# Patient Record
Sex: FEMALE | Race: WHITE | HISPANIC OR LATINO | Employment: UNEMPLOYED | URBAN - METROPOLITAN AREA
[De-identification: names, ages, dates, MRNs, and addresses within clinical notes are randomized per-mention and may not be internally consistent; named-entity substitution may affect disease eponyms.]

---

## 2017-10-26 ENCOUNTER — HOSPITAL ENCOUNTER (EMERGENCY)
Facility: HOSPITAL | Age: 47
Discharge: HOME | End: 2017-10-26
Attending: EMERGENCY MEDICINE
Payer: MEDICAID

## 2017-10-26 VITALS
WEIGHT: 204 LBS | OXYGEN SATURATION: 98 % | SYSTOLIC BLOOD PRESSURE: 129 MMHG | DIASTOLIC BLOOD PRESSURE: 67 MMHG | TEMPERATURE: 99 F | RESPIRATION RATE: 15 BRPM | BODY MASS INDEX: 36.14 KG/M2 | HEIGHT: 63 IN | HEART RATE: 77 BPM

## 2017-10-26 DIAGNOSIS — M54.32 SCIATICA OF LEFT SIDE: Primary | ICD-10-CM

## 2017-10-26 LAB
B-HCG UR QL: NEGATIVE
CTP QC/QA: YES

## 2017-10-26 PROCEDURE — 99284 EMERGENCY DEPT VISIT MOD MDM: CPT | Mod: 25

## 2017-10-26 PROCEDURE — 81025 URINE PREGNANCY TEST: CPT | Performed by: EMERGENCY MEDICINE

## 2017-10-26 PROCEDURE — 96372 THER/PROPH/DIAG INJ SC/IM: CPT

## 2017-10-26 PROCEDURE — 25000003 PHARM REV CODE 250: Performed by: EMERGENCY MEDICINE

## 2017-10-26 PROCEDURE — 63600175 PHARM REV CODE 636 W HCPCS: Performed by: EMERGENCY MEDICINE

## 2017-10-26 RX ORDER — HYDROCODONE BITARTRATE AND ACETAMINOPHEN 7.5; 325 MG/1; MG/1
1 TABLET ORAL EVERY 6 HOURS PRN
Qty: 15 TABLET | Refills: 0 | Status: SHIPPED | OUTPATIENT
Start: 2017-10-26 | End: 2017-11-05

## 2017-10-26 RX ORDER — ORPHENADRINE CITRATE 30 MG/ML
60 INJECTION INTRAMUSCULAR; INTRAVENOUS
Status: COMPLETED | OUTPATIENT
Start: 2017-10-26 | End: 2017-10-26

## 2017-10-26 RX ORDER — IBUPROFEN 600 MG/1
600 TABLET ORAL EVERY 6 HOURS PRN
Qty: 20 TABLET | Refills: 0 | OUTPATIENT
Start: 2017-10-26 | End: 2020-02-06

## 2017-10-26 RX ORDER — ONDANSETRON 4 MG/1
4 TABLET, ORALLY DISINTEGRATING ORAL
Status: COMPLETED | OUTPATIENT
Start: 2017-10-26 | End: 2017-10-26

## 2017-10-26 RX ORDER — KETOROLAC TROMETHAMINE 30 MG/ML
60 INJECTION, SOLUTION INTRAMUSCULAR; INTRAVENOUS
Status: COMPLETED | OUTPATIENT
Start: 2017-10-26 | End: 2017-10-26

## 2017-10-26 RX ORDER — OXYCODONE AND ACETAMINOPHEN 7.5; 325 MG/1; MG/1
1 TABLET ORAL ONCE
Status: COMPLETED | OUTPATIENT
Start: 2017-10-26 | End: 2017-10-26

## 2017-10-26 RX ORDER — CYCLOBENZAPRINE HCL 5 MG
5 TABLET ORAL 3 TIMES DAILY PRN
Qty: 20 TABLET | Refills: 0 | Status: SHIPPED | OUTPATIENT
Start: 2017-10-26 | End: 2017-10-31

## 2017-10-26 RX ADMIN — OXYCODONE HYDROCHLORIDE AND ACETAMINOPHEN 1 TABLET: 7.5; 325 TABLET ORAL at 11:10

## 2017-10-26 RX ADMIN — KETOROLAC TROMETHAMINE 60 MG: 30 INJECTION, SOLUTION INTRAMUSCULAR at 11:10

## 2017-10-26 RX ADMIN — ONDANSETRON 4 MG: 4 TABLET, ORALLY DISINTEGRATING ORAL at 11:10

## 2017-10-26 RX ADMIN — ORPHENADRINE CITRATE 60 MG: 30 INJECTION INTRAMUSCULAR; INTRAVENOUS at 11:10

## 2017-10-26 NOTE — ED PROVIDER NOTES
Encounter Date: 10/26/2017       History     Chief Complaint   Patient presents with    Back Pain     lft buttocks pain radiating down leg     The history is provided by the patient. The history is limited by a language barrier. A  was used.   Back Pain    This is a new problem. The pain is associated with no known injury. The pain is present in the lumbar spine. The pain radiates to the left leg. The pain is at a severity of 10/10. The pain is the same all the time. Pertinent negatives include no fever, no numbness, no abdominal pain, no bowel incontinence, no bladder incontinence, no dysuria and no paresthesias.     Review of patient's allergies indicates:  No Known Allergies  Past Medical History:   Diagnosis Date    Migraine headache      Past Surgical History:   Procedure Laterality Date     SECTION       History reviewed. No pertinent family history.  Social History   Substance Use Topics    Smoking status: Current Every Day Smoker    Smokeless tobacco: Never Used    Alcohol use No     Review of Systems   Constitutional: Negative for fever.   Gastrointestinal: Negative for abdominal pain, bowel incontinence, nausea and vomiting.   Genitourinary: Negative for bladder incontinence and dysuria.   Musculoskeletal: Positive for back pain. Negative for neck pain.   Skin: Negative for rash.   Neurological: Negative for numbness and paresthesias.   All other systems reviewed and are negative.      Physical Exam     Initial Vitals [10/26/17 1018]   BP Pulse Resp Temp SpO2   (!) 141/99 95 15 98.6 °F (37 °C) 98 %      MAP       113         Physical Exam    Nursing note and vitals reviewed.  Constitutional: She appears distressed.   HENT:   Head: Normocephalic and atraumatic.   Eyes: EOM are normal.   Neck: Normal range of motion. Neck supple.   Cardiovascular: Normal rate, regular rhythm and normal heart sounds.   Pulmonary/Chest: Breath sounds normal.   Abdominal: Soft. There is no  tenderness.   Musculoskeletal: Normal range of motion.   Tenderness of the left lower lumbar paraspinous muscle extending to the left upper buttock.   Neurological: She is alert and oriented to person, place, and time. She has normal strength and normal reflexes. No sensory deficit.   Skin: Skin is warm and dry.   Psychiatric: Her behavior is normal. Thought content normal.         ED Course   Procedures  Labs Reviewed   POCT URINE PREGNANCY        Imaging Results          X-Ray Lumbar Spine Ap And Lateral (Final result)  Result time 10/26/17 11:43:51    Final result by Manish Navas MD (10/26/17 11:43:51)                 Impression:        No acute bony abnormality in the lumbar spine.      Electronically signed by: Manish Navas  Date:     10/26/17  Time:    11:43              Narrative:    COMPARISON: None.    CLINICAL INFORMATION: Sciatica.    FINDINGS: Three views of the lumbar spine.  No evidence of acute fracture or subluxation.  Normal curvature and alignment.  Vertebral body and disc space heights are relatively well-maintained. Mild calcific atherosclerosis of the abdominal aorta.                                 Medical Decision Making:   Clinical Tests:   Radiological Study: Ordered and Reviewed  ED Management:  46-year-old female with left-sided sciatica.  Lumbar x-rays unremarkable.  She was given IM shots of Toradol and Norflex here in the ED.  She was also given a Percocet.  Patient will be discharged home with prescriptions for ibuprofen, Flexeril and Norco.  I have suggested she follow-up with her primary physician as soon as able for recheck, further treatment as warranted, and/or referral.                     ED Course      Clinical Impression:   The encounter diagnosis was Sciatica of left side.                           Antione Reid MD  10/26/17 1580

## 2017-10-26 NOTE — ED NOTES
Pt here c/o left lower back/gluteal pain that radiates down leg. described as shooting. denies med allergies. denies new activity,bowel or bladder changes.

## 2018-11-07 ENCOUNTER — HOSPITAL ENCOUNTER (EMERGENCY)
Facility: HOSPITAL | Age: 48
Discharge: HOME | End: 2018-11-07
Attending: EMERGENCY MEDICINE
Payer: MEDICAID

## 2018-11-07 VITALS
HEART RATE: 92 BPM | WEIGHT: 204 LBS | SYSTOLIC BLOOD PRESSURE: 158 MMHG | DIASTOLIC BLOOD PRESSURE: 86 MMHG | HEIGHT: 63 IN | TEMPERATURE: 98 F | BODY MASS INDEX: 36.14 KG/M2 | RESPIRATION RATE: 18 BRPM | OXYGEN SATURATION: 97 %

## 2018-11-07 DIAGNOSIS — N20.0 KIDNEY STONE: Primary | ICD-10-CM

## 2018-11-07 LAB
ALBUMIN SERPL BCP-MCNC: 3.4 G/DL
ALP SERPL-CCNC: 101 U/L
ALT SERPL W/O P-5'-P-CCNC: 18 U/L
ANION GAP SERPL CALC-SCNC: 10 MMOL/L
AST SERPL-CCNC: 12 U/L
B-HCG UR QL: NEGATIVE
BASOPHILS # BLD AUTO: 0.04 K/UL
BASOPHILS NFR BLD: 0.3 %
BILIRUB SERPL-MCNC: 0.2 MG/DL
BILIRUB UR QL STRIP: NEGATIVE
BUN SERPL-MCNC: 12 MG/DL
CALCIUM SERPL-MCNC: 9.3 MG/DL
CHLORIDE SERPL-SCNC: 103 MMOL/L
CLARITY UR: CLEAR
CO2 SERPL-SCNC: 23 MMOL/L
COLOR UR: YELLOW
CREAT SERPL-MCNC: 0.8 MG/DL
CTP QC/QA: YES
DIFFERENTIAL METHOD: ABNORMAL
EOSINOPHIL # BLD AUTO: 0.2 K/UL
EOSINOPHIL NFR BLD: 1.7 %
ERYTHROCYTE [DISTWIDTH] IN BLOOD BY AUTOMATED COUNT: 15 %
EST. GFR  (AFRICAN AMERICAN): >60 ML/MIN/1.73 M^2
EST. GFR  (NON AFRICAN AMERICAN): >60 ML/MIN/1.73 M^2
GLUCOSE SERPL-MCNC: 133 MG/DL
GLUCOSE UR QL STRIP: NEGATIVE
HCT VFR BLD AUTO: 42.3 %
HGB BLD-MCNC: 13.7 G/DL
HGB UR QL STRIP: ABNORMAL
KETONES UR QL STRIP: NEGATIVE
LEUKOCYTE ESTERASE UR QL STRIP: NEGATIVE
LYMPHOCYTES # BLD AUTO: 2.1 K/UL
LYMPHOCYTES NFR BLD: 17.7 %
MCH RBC QN AUTO: 26.6 PG
MCHC RBC AUTO-ENTMCNC: 32.4 G/DL
MCV RBC AUTO: 82 FL
MICROSCOPIC COMMENT: ABNORMAL
MONOCYTES # BLD AUTO: 1 K/UL
MONOCYTES NFR BLD: 8.6 %
NEUTROPHILS # BLD AUTO: 8.6 K/UL
NEUTROPHILS NFR BLD: 71.5 %
NITRITE UR QL STRIP: NEGATIVE
PH UR STRIP: 6 [PH] (ref 5–8)
PLATELET # BLD AUTO: 249 K/UL
PMV BLD AUTO: 12.6 FL
POTASSIUM SERPL-SCNC: 3.9 MMOL/L
PROT SERPL-MCNC: 7.5 G/DL
PROT UR QL STRIP: NEGATIVE
RBC # BLD AUTO: 5.15 M/UL
RBC #/AREA URNS HPF: 5 /HPF (ref 0–4)
SODIUM SERPL-SCNC: 136 MMOL/L
SP GR UR STRIP: >=1.03 (ref 1–1.03)
SQUAMOUS #/AREA URNS HPF: 5 /HPF
URN SPEC COLLECT METH UR: ABNORMAL
UROBILINOGEN UR STRIP-ACNC: NEGATIVE EU/DL
WBC # BLD AUTO: 12.05 K/UL

## 2018-11-07 PROCEDURE — 96374 THER/PROPH/DIAG INJ IV PUSH: CPT

## 2018-11-07 PROCEDURE — 96375 TX/PRO/DX INJ NEW DRUG ADDON: CPT

## 2018-11-07 PROCEDURE — 85025 COMPLETE CBC W/AUTO DIFF WBC: CPT

## 2018-11-07 PROCEDURE — 99284 EMERGENCY DEPT VISIT MOD MDM: CPT | Mod: 25

## 2018-11-07 PROCEDURE — 81025 URINE PREGNANCY TEST: CPT | Performed by: EMERGENCY MEDICINE

## 2018-11-07 PROCEDURE — 80053 COMPREHEN METABOLIC PANEL: CPT

## 2018-11-07 PROCEDURE — 81000 URINALYSIS NONAUTO W/SCOPE: CPT

## 2018-11-07 PROCEDURE — 63600175 PHARM REV CODE 636 W HCPCS: Performed by: EMERGENCY MEDICINE

## 2018-11-07 RX ORDER — NAPROXEN 500 MG/1
500 TABLET ORAL 2 TIMES DAILY WITH MEALS
Qty: 30 TABLET | Refills: 0 | OUTPATIENT
Start: 2018-11-07 | End: 2020-02-06

## 2018-11-07 RX ORDER — KETOROLAC TROMETHAMINE 30 MG/ML
15 INJECTION, SOLUTION INTRAMUSCULAR; INTRAVENOUS
Status: COMPLETED | OUTPATIENT
Start: 2018-11-07 | End: 2018-11-07

## 2018-11-07 RX ORDER — ONDANSETRON 4 MG/1
4 TABLET, ORALLY DISINTEGRATING ORAL EVERY 6 HOURS PRN
Qty: 12 TABLET | Refills: 0 | Status: SHIPPED | OUTPATIENT
Start: 2018-11-07 | End: 2021-02-19 | Stop reason: CLARIF

## 2018-11-07 RX ORDER — MORPHINE SULFATE 4 MG/ML
4 INJECTION, SOLUTION INTRAMUSCULAR; INTRAVENOUS
Status: COMPLETED | OUTPATIENT
Start: 2018-11-07 | End: 2018-11-07

## 2018-11-07 RX ORDER — HYDROCODONE BITARTRATE AND ACETAMINOPHEN 5; 325 MG/1; MG/1
1 TABLET ORAL EVERY 4 HOURS PRN
Qty: 18 TABLET | Refills: 0 | Status: SHIPPED | OUTPATIENT
Start: 2018-11-07 | End: 2021-02-19 | Stop reason: CLARIF

## 2018-11-07 RX ORDER — TAMSULOSIN HYDROCHLORIDE 0.4 MG/1
0.4 CAPSULE ORAL DAILY
Qty: 10 CAPSULE | Refills: 0 | Status: SHIPPED | OUTPATIENT
Start: 2018-11-07 | End: 2023-01-25

## 2018-11-07 RX ADMIN — MORPHINE SULFATE 4 MG: 4 INJECTION INTRAVENOUS at 02:11

## 2018-11-07 RX ADMIN — KETOROLAC TROMETHAMINE 15 MG: 30 INJECTION, SOLUTION INTRAMUSCULAR at 01:11

## 2018-11-07 RX ADMIN — MORPHINE SULFATE 4 MG: 4 INJECTION INTRAVENOUS at 03:11

## 2018-11-07 NOTE — ED NOTES
Pt ambulatory to ED with c/o left sided flank pain that radiates to abdomen. Rates pain as 10/10 and states the pain started tonight. Reports feeling like she needs to urinate but has not been able to. Ion at bedside to translate. Mildly distressed, AAOx3.

## 2018-11-07 NOTE — ED NOTES
I have interpreted for Dr. Salazar at the bedside to update the patient on her results and follow up information.

## 2018-11-07 NOTE — ED PROVIDER NOTES
Encounter Date: 2018    SCRIBE #1 NOTE: I, Zuri Hubbard, am scribing for, and in the presence of,  Dr. Salazar. I have scribed the entire note.       History     Chief Complaint   Patient presents with    Flank Pain     Patient presents to the ED with reports of having left sided flank pain that radiates around her abomen. States the pain started tonight. No relief with excedrin. Reports having the sensation of needing to urinate but has not gone to the bathroom.      Clayton Sandoval is a 47 y.o. female who  has a past medical history of Migraine headache.    The patient presents to the ED due to left lower back pain with 2 episodes of emesis that began approximately 2 hours ago. She also reports of burning sensation during urination and sensation to urinate. However the patient reports she is unable to urinate. She denies fever. She reports of no relief from Excedrin. Her LMP was , but reports she normally has an irregular cycle.      The history is provided by the patient ().     Review of patient's allergies indicates:  No Known Allergies  Past Medical History:   Diagnosis Date    Migraine headache      Past Surgical History:   Procedure Laterality Date     SECTION       History reviewed. No pertinent family history.  Social History     Tobacco Use    Smoking status: Current Every Day Smoker     Packs/day: 0.35     Types: Cigarettes    Smokeless tobacco: Never Used   Substance Use Topics    Alcohol use: No    Drug use: No     Review of Systems   Constitutional: Negative for chills and fever.   HENT: Negative for congestion, rhinorrhea and sore throat.    Eyes: Negative for redness and visual disturbance.   Respiratory: Negative for cough, shortness of breath and wheezing.    Cardiovascular: Negative for chest pain and palpitations.   Gastrointestinal: Positive for vomiting. Negative for abdominal pain, diarrhea and nausea.   Genitourinary: Positive for difficulty  urinating and dysuria (burning sensation). Negative for hematuria.   Musculoskeletal: Positive for back pain (left lower back). Negative for myalgias and neck pain.   Skin: Negative for rash.   Neurological: Negative for dizziness, weakness and light-headedness.   Psychiatric/Behavioral: Negative for confusion.       Physical Exam     Initial Vitals [11/07/18 0126]   BP Pulse Resp Temp SpO2   (!) 181/104 97 20 98.4 °F (36.9 °C) 100 %      MAP       --         Physical Exam    Nursing note and vitals reviewed.  Constitutional: She appears well-developed and well-nourished.   HENT:   Head: Normocephalic and atraumatic.   Eyes: EOM are normal. Pupils are equal, round, and reactive to light.   Neck: Normal range of motion. Neck supple.   Cardiovascular: Normal rate, regular rhythm and normal heart sounds.   Pulmonary/Chest: Breath sounds normal. No respiratory distress.   Abdominal: Soft. Bowel sounds are normal. There is no tenderness.   Musculoskeletal: Normal range of motion. She exhibits tenderness (Left lower back).   Neurological: She is alert and oriented to person, place, and time. She has normal strength.   Skin: Skin is warm and dry. Capillary refill takes less than 2 seconds.         ED Course   Procedures  Labs Reviewed   CBC W/ AUTO DIFFERENTIAL - Abnormal; Notable for the following components:       Result Value    MCH 26.6 (*)     RDW 15.0 (*)     Gran # (ANC) 8.6 (*)     Lymph% 17.7 (*)     All other components within normal limits   COMPREHENSIVE METABOLIC PANEL - Abnormal; Notable for the following components:    Glucose 133 (*)     Albumin 3.4 (*)     All other components within normal limits   URINALYSIS, REFLEX TO URINE CULTURE - Abnormal; Notable for the following components:    Specific Gravity, UA >=1.030 (*)     Occult Blood UA 2+ (*)     All other components within normal limits    Narrative:     Preferred Collection Type->Urine, Clean Catch   URINALYSIS MICROSCOPIC - Abnormal; Notable for the  following components:    RBC, UA 5 (*)     All other components within normal limits    Narrative:     Preferred Collection Type->Urine, Clean Catch   POCT URINE PREGNANCY          Imaging Results          CT Renal Stone Study ABD Pelvis WO (Final result)  Result time 11/07/18 02:43:50    Final result by Kristi Crespo MD (11/07/18 02:43:50)                 Impression:      1. Small 2 mm stone at the left UVJ resulting in minimal left-sided hydronephrosis.  2. Small uterine fibroids.      Electronically signed by: Kristi Crespo MD  Date:    11/07/2018  Time:    02:43             Narrative:    EXAMINATION:  CT RENAL STONE STUDY ABD PELVIS WO    CLINICAL HISTORY:  Flank pain, stone disease suspected;    TECHNIQUE:  Low dose axial images, sagittal and coronal reformations were obtained from the lung bases to the pubic symphysis.  Contrast was not administered.    COMPARISON:  None    FINDINGS:  The visualized portion of the heart is unremarkable.  The lung bases are clear.    No significant hepatic abnormality seen on this noncontrast exam.  There is no intra-or extrahepatic biliary ductal dilatation.  Gallbladder is not visualized and may be contracted or has been removed.  The stomach, pancreas, spleen, and adrenal glands are unremarkable.    Small 2 mm stone is visualized at the left UVJ resulting in minimal left-sided hydronephrosis.  No additional renal stones are seen.  No hydronephrosis on the right.  No right ureteral stones are visualized.  Urinary bladder is nondistended.  Mild lobular contour seen of the uterus suggestive for small uterine fibroids.    Appendix is visualized and is unremarkable.  The visualized loops of small and large bowel show no evidence of obstruction or inflammation.  No free air or free fluid.    Aorta tapers normally.    No acute osseous abnormality identified. Subcutaneous soft tissue structures are unremarkable.                                 Medical Decision Making:    Clinical Tests:   Lab Tests: Ordered and Reviewed  Radiological Study: Ordered and Reviewed  ED Management:  Pt has 2mm stone at left uvj. No uti. Will dc w pain control and flomax. F/u w urology              Attending Attestation:           Physician Attestation for Scribe:  Physician Attestation Statement for Scribe #1: I, Toi Salazar, reviewed documentation, as scribed by Zuri hall in my presence, and it is both accurate and complete.                    Clinical Impression:     1. Kidney stone        Disposition:   Disposition: Discharged  Condition: Stable                        Toi Salazar MD  11/07/18 0256

## 2020-02-06 ENCOUNTER — HOSPITAL ENCOUNTER (EMERGENCY)
Facility: HOSPITAL | Age: 50
Discharge: HOME | End: 2020-02-06
Attending: EMERGENCY MEDICINE
Payer: MEDICAID

## 2020-02-06 VITALS
BODY MASS INDEX: 42.93 KG/M2 | SYSTOLIC BLOOD PRESSURE: 148 MMHG | WEIGHT: 199 LBS | HEIGHT: 57 IN | HEART RATE: 99 BPM | DIASTOLIC BLOOD PRESSURE: 95 MMHG | RESPIRATION RATE: 19 BRPM | OXYGEN SATURATION: 97 % | TEMPERATURE: 100 F

## 2020-02-06 DIAGNOSIS — M54.42 ACUTE BILATERAL LOW BACK PAIN WITH LEFT-SIDED SCIATICA: Primary | ICD-10-CM

## 2020-02-06 LAB
ALBUMIN SERPL BCP-MCNC: 3.6 G/DL (ref 3.5–5.2)
ALP SERPL-CCNC: 96 U/L (ref 55–135)
ALT SERPL W/O P-5'-P-CCNC: 19 U/L (ref 10–44)
ANION GAP SERPL CALC-SCNC: 8 MMOL/L (ref 8–16)
AST SERPL-CCNC: 16 U/L (ref 10–40)
BASOPHILS # BLD AUTO: 0.07 K/UL (ref 0–0.2)
BASOPHILS NFR BLD: 0.7 % (ref 0–1.9)
BILIRUB SERPL-MCNC: 0.4 MG/DL (ref 0.1–1)
BILIRUB UR QL STRIP: NEGATIVE
BUN SERPL-MCNC: 6 MG/DL (ref 6–20)
CALCIUM SERPL-MCNC: 9.1 MG/DL (ref 8.7–10.5)
CHLORIDE SERPL-SCNC: 104 MMOL/L (ref 95–110)
CLARITY UR: CLEAR
CO2 SERPL-SCNC: 27 MMOL/L (ref 23–29)
COLOR UR: YELLOW
CREAT SERPL-MCNC: 0.6 MG/DL (ref 0.5–1.4)
DIFFERENTIAL METHOD: ABNORMAL
EOSINOPHIL # BLD AUTO: 0.2 K/UL (ref 0–0.5)
EOSINOPHIL NFR BLD: 1.6 % (ref 0–8)
ERYTHROCYTE [DISTWIDTH] IN BLOOD BY AUTOMATED COUNT: 14.3 % (ref 11.5–14.5)
EST. GFR  (AFRICAN AMERICAN): >60 ML/MIN/1.73 M^2
EST. GFR  (NON AFRICAN AMERICAN): >60 ML/MIN/1.73 M^2
GLUCOSE SERPL-MCNC: 92 MG/DL (ref 70–110)
GLUCOSE UR QL STRIP: NEGATIVE
HCT VFR BLD AUTO: 43.6 % (ref 37–48.5)
HGB BLD-MCNC: 14 G/DL (ref 12–16)
HGB UR QL STRIP: ABNORMAL
IMM GRANULOCYTES # BLD AUTO: 0.08 K/UL (ref 0–0.04)
IMM GRANULOCYTES NFR BLD AUTO: 0.8 % (ref 0–0.5)
INFLUENZA A, MOLECULAR: NEGATIVE
INFLUENZA B, MOLECULAR: NEGATIVE
KETONES UR QL STRIP: NEGATIVE
LEUKOCYTE ESTERASE UR QL STRIP: NEGATIVE
LYMPHOCYTES # BLD AUTO: 2.2 K/UL (ref 1–4.8)
LYMPHOCYTES NFR BLD: 21.4 % (ref 18–48)
MCH RBC QN AUTO: 26.8 PG (ref 27–31)
MCHC RBC AUTO-ENTMCNC: 32.1 G/DL (ref 32–36)
MCV RBC AUTO: 83 FL (ref 82–98)
MONOCYTES # BLD AUTO: 0.6 K/UL (ref 0.3–1)
MONOCYTES NFR BLD: 6.3 % (ref 4–15)
NEUTROPHILS # BLD AUTO: 7.1 K/UL (ref 1.8–7.7)
NEUTROPHILS NFR BLD: 69.2 % (ref 38–73)
NITRITE UR QL STRIP: NEGATIVE
NRBC BLD-RTO: 0 /100 WBC
PH UR STRIP: 7 [PH] (ref 5–8)
PLATELET # BLD AUTO: 290 K/UL (ref 150–350)
PMV BLD AUTO: 11.9 FL (ref 9.2–12.9)
POTASSIUM SERPL-SCNC: 3.8 MMOL/L (ref 3.5–5.1)
PROT SERPL-MCNC: 7.5 G/DL (ref 6–8.4)
PROT UR QL STRIP: NEGATIVE
RBC # BLD AUTO: 5.23 M/UL (ref 4–5.4)
SODIUM SERPL-SCNC: 139 MMOL/L (ref 136–145)
SP GR UR STRIP: 1.02 (ref 1–1.03)
SPECIMEN SOURCE: NORMAL
URN SPEC COLLECT METH UR: ABNORMAL
UROBILINOGEN UR STRIP-ACNC: NEGATIVE EU/DL
WBC # BLD AUTO: 10.24 K/UL (ref 3.9–12.7)

## 2020-02-06 PROCEDURE — 96375 TX/PRO/DX INJ NEW DRUG ADDON: CPT

## 2020-02-06 PROCEDURE — 96374 THER/PROPH/DIAG INJ IV PUSH: CPT

## 2020-02-06 PROCEDURE — 85025 COMPLETE CBC W/AUTO DIFF WBC: CPT

## 2020-02-06 PROCEDURE — 81003 URINALYSIS AUTO W/O SCOPE: CPT

## 2020-02-06 PROCEDURE — 96361 HYDRATE IV INFUSION ADD-ON: CPT

## 2020-02-06 PROCEDURE — 87502 INFLUENZA DNA AMP PROBE: CPT

## 2020-02-06 PROCEDURE — 80053 COMPREHEN METABOLIC PANEL: CPT

## 2020-02-06 PROCEDURE — 63600175 PHARM REV CODE 636 W HCPCS: Performed by: PHYSICIAN ASSISTANT

## 2020-02-06 PROCEDURE — 99284 EMERGENCY DEPT VISIT MOD MDM: CPT | Mod: 25

## 2020-02-06 RX ORDER — KETOROLAC TROMETHAMINE 30 MG/ML
15 INJECTION, SOLUTION INTRAMUSCULAR; INTRAVENOUS
Status: COMPLETED | OUTPATIENT
Start: 2020-02-06 | End: 2020-02-06

## 2020-02-06 RX ORDER — LIDOCAINE 50 MG/G
1 PATCH TOPICAL DAILY
Qty: 15 PATCH | Refills: 0 | Status: SHIPPED | OUTPATIENT
Start: 2020-02-06 | End: 2021-02-19 | Stop reason: CLARIF

## 2020-02-06 RX ORDER — ONDANSETRON 2 MG/ML
4 INJECTION INTRAMUSCULAR; INTRAVENOUS
Status: COMPLETED | OUTPATIENT
Start: 2020-02-06 | End: 2020-02-06

## 2020-02-06 RX ORDER — METHOCARBAMOL 750 MG/1
1500 TABLET, FILM COATED ORAL 3 TIMES DAILY
Qty: 30 TABLET | Refills: 0 | Status: SHIPPED | OUTPATIENT
Start: 2020-02-06 | End: 2020-02-11

## 2020-02-06 RX ORDER — NAPROXEN 500 MG/1
500 TABLET ORAL 2 TIMES DAILY WITH MEALS
Qty: 20 TABLET | Refills: 0 | Status: SHIPPED | OUTPATIENT
Start: 2020-02-06 | End: 2021-02-19 | Stop reason: CLARIF

## 2020-02-06 RX ADMIN — ONDANSETRON 4 MG: 2 INJECTION INTRAMUSCULAR; INTRAVENOUS at 09:02

## 2020-02-06 RX ADMIN — KETOROLAC TROMETHAMINE 15 MG: 30 INJECTION, SOLUTION INTRAMUSCULAR at 09:02

## 2020-02-06 RX ADMIN — SODIUM CHLORIDE 1000 ML: 0.9 INJECTION, SOLUTION INTRAVENOUS at 09:02

## 2020-02-06 NOTE — ED PROVIDER NOTES
Encounter Date: 2020       History     Chief Complaint   Patient presents with    Back Pain     Reports pain that starts in the lower mid back radiating to L flank and around to lower abdomen. States was recently treated for Kidney infection 2 weeks ago.      Clayton Sandoval  49 y.o. female with PMH of migraines and previous nephrolithiasis presented to the ED with c/o low back pain. Patient states that she has had pain for the past several days.  She denies any recent falls or trauma. She states that is located to the bilateral lower back and radiates to the left lateral lower extremity.  Patient does report some mild radiation to the left lower abdomen.  She states that she was recently seen by her PCP and told that she had a kidney infection for which she completed antibiotics.  She denies any dysuria or  symptoms at this time.  She has not tried any medications for the symptoms. She denies any numbness, tingling, weakness, fever, chills, nausea, vomiting, diarrhea or other complaints at this time.    The history is provided by the patient.     Review of patient's allergies indicates:  No Known Allergies  Past Medical History:   Diagnosis Date    Migraine headache      Past Surgical History:   Procedure Laterality Date     SECTION       History reviewed. No pertinent family history.  Social History     Tobacco Use    Smoking status: Current Every Day Smoker     Packs/day: 0.35     Types: Cigarettes    Smokeless tobacco: Never Used   Substance Use Topics    Alcohol use: No    Drug use: No     Review of Systems   Constitutional: Negative for chills and fever.   Respiratory: Negative for shortness of breath.    Gastrointestinal: Negative for constipation, diarrhea, nausea and vomiting.   Genitourinary: Positive for flank pain. Negative for dysuria.   Musculoskeletal: Positive for back pain. Negative for arthralgias.   Skin: Negative for color change and rash.   Neurological: Negative for  weakness.   Hematological: Does not bruise/bleed easily.       Physical Exam     Initial Vitals   BP Pulse Resp Temp SpO2   02/06/20 0920 02/06/20 0920 02/06/20 0920 02/06/20 0922 02/06/20 0920   (!) 148/95 99 19 100.1 °F (37.8 °C) 97 %      MAP       --                Vitals:    02/06/20 0922   BP:    Pulse:    Resp:    Temp: 100.1 °F (37.8 °C)       Physical Exam    Nursing note and vitals reviewed.  Constitutional: Vital signs are normal. She appears well-developed and well-nourished. She is cooperative.  Non-toxic appearance. She does not appear ill. No distress.   HENT:   Head: Normocephalic and atraumatic.   Eyes: Conjunctivae and lids are normal.   Neck: Neck supple. No neck rigidity.   Cardiovascular: Normal rate and regular rhythm.   Pulmonary/Chest: Breath sounds normal. No respiratory distress. She has no wheezes. She has no rhonchi.   Abdominal: Soft. Normal appearance and bowel sounds are normal. There is no tenderness. There is no rigidity and no guarding.   Musculoskeletal: Normal range of motion.        Back:    No midline tenderness; no step-off or obvious bony deformity.  No CVA tenderness. Negative straight leg raise.   Neurological: She is alert and oriented to person, place, and time. She has normal strength. No sensory deficit. GCS eye subscore is 4. GCS verbal subscore is 5. GCS motor subscore is 6.   Skin: Skin is warm, dry and intact. No rash noted.   Psychiatric: She has a normal mood and affect. Her speech is normal and behavior is normal. Thought content normal.         ED Course   Procedures  Labs Reviewed   URINALYSIS, REFLEX TO URINE CULTURE - Abnormal; Notable for the following components:       Result Value    Occult Blood UA Trace (*)     All other components within normal limits    Narrative:     Preferred Collection Type->Urine, Clean Catch   CBC W/ AUTO DIFFERENTIAL   COMPREHENSIVE METABOLIC PANEL          Imaging Results          CT Renal Stone Study ABD Pelvis WO (Final result)   Result time 02/06/20 11:22:06    Final result by Ramos Benavidez Jr., MD (02/06/20 11:22:06)                 Impression:      Negative for obstructive uropathy    Uterine fibroids and atherosclerosis      Electronically signed by: Ramos Benson  Date:    02/06/2020  Time:    11:22             Narrative:    EXAMINATION:  CT RENAL STONE STUDY ABD PELVIS WO    CLINICAL HISTORY:  Flank pain, recurrent stone disease suspected;Flank pain, stone disease suspected;Hematuria;    TECHNIQUE:  Low dose axial images, sagittal and coronal reformations were obtained from the lung bases to the pubic symphysis.  Contrast was not administered.    COMPARISON:  11/07/2018    FINDINGS:  Lung Bases: Unremarkable.    Kidneys/ Ureters: Unremarkable.  No renal or ureteral stone or hydronephrosis.    Liver: Normal in size and attenuation, with no focal hepatic lesions.    Gallbladder: No calcified gallstones.    Bile Ducts: No evidence of dilated ducts.    Pancreas: No mass or peripancreatic fat stranding.    Spleen: Unremarkable.    Adrenals: Unremarkable.    Pelvic organs: Lobulated contour of the uterus suggests fibroids.  No adnexal masses.  Urinary bladder unremarkable.    GI Tract/Mesentery: Noncontrast opacified GI tract including appendix unremarkable.    Retroperitoneum: No significant adenopathy.    Vasculature: Mild atherosclerosis    Bones: Unremarkable.                            Clayton Sandoval  49 y.o. female with PMH of migraines and previous nephrolithiasis presented to the ED with c/o low back pain. Patient states that she has had pain for the past several days.  She denies any recent falls or trauma. She states that is located to the bilateral lower back and radiates to the left lateral lower extremity.  Patient does report some mild radiation to the left lower abdomen.  She states that she was recently seen by her PCP and told that she had a kidney infection for which she completed antibiotics.  She denies any  dysuria or  symptoms at this time.  She has not tried any medications for the symptoms. She denies any numbness, tingling, weakness, fever, chills, nausea, vomiting, diarrhea or other complaints at this time. ROS positive for back pain.  Physical exam reveals patient well appearing in no distress exhibiting smooth steady gait to room. FROM of neck and all extremities with strength 5/5 bilaterally. TTP of the bilateral lower paraspinal muscles with spasm noted; no midline tenderness, bony deformity or step-off.  Patient has tenderness to palpation of the left SI joint.  Negative straight leg raise.  Lungs clear, heart regular rate and rhythm. Abdomen is soft and nontender with no rebound or rigidity; no CVA tenderness.     DDX: back spasm, fracture, dislocation, nephrolithiasis, pyelonephritis, UTI    ED management:  Given patient's history of previous kidney stone and reported pain radiation labs and CT obtained.  Labs grossly unremarkable.  CT negative for any obstructive process or acute etiology to explain pain.  Did discuss on physical exam today symptoms are most consistent with discogenic back pain with some lumbar radiculopathy.  No further workup as patient with no signs to suggest acute infectious process or acute cauda equina.  She did report some improvement symptoms in the ED.  We will treat with symptomatic medications for back spasm. Encouraged rest, hot soaks and massage.     Impression/Plan: The encounter diagnosis was Acute bilateral low back pain with left-sided sciatica.  Discharged with Lidoderm patch, Robaxin and Naprosyn.  Patient will follow up with Primary.  Patient cautioned on when to return to ED.  Pt. Understands and agrees with current treatment plan                                       Clinical Impression:       ICD-10-CM ICD-9-CM   1. Acute bilateral low back pain with left-sided sciatica M54.42 724.2     724.3                             ROBIN Sahu  02/07/20 2245

## 2020-03-13 ENCOUNTER — OFFICE VISIT (OUTPATIENT)
Dept: OBSTETRICS AND GYNECOLOGY | Facility: CLINIC | Age: 50
End: 2020-03-13
Payer: MEDICAID

## 2020-03-13 VITALS
BODY MASS INDEX: 44.19 KG/M2 | SYSTOLIC BLOOD PRESSURE: 130 MMHG | HEIGHT: 57 IN | WEIGHT: 204.81 LBS | DIASTOLIC BLOOD PRESSURE: 90 MMHG

## 2020-03-13 DIAGNOSIS — N93.9 ABNORMAL UTERINE BLEEDING (AUB): Primary | ICD-10-CM

## 2020-03-13 DIAGNOSIS — Z12.31 VISIT FOR SCREENING MAMMOGRAM: ICD-10-CM

## 2020-03-13 PROCEDURE — 88141 CYTOPATH C/V INTERPRET: CPT | Mod: ,,, | Performed by: PATHOLOGY

## 2020-03-13 PROCEDURE — 99213 OFFICE O/P EST LOW 20 MIN: CPT | Mod: PBBFAC,PO,25 | Performed by: OBSTETRICS & GYNECOLOGY

## 2020-03-13 PROCEDURE — 99203 OFFICE O/P NEW LOW 30 MIN: CPT | Mod: 25,S$PBB,, | Performed by: OBSTETRICS & GYNECOLOGY

## 2020-03-13 PROCEDURE — 58100 BIOPSY OF UTERUS LINING: CPT | Mod: S$PBB,,, | Performed by: OBSTETRICS & GYNECOLOGY

## 2020-03-13 PROCEDURE — 88305 TISSUE EXAM BY PATHOLOGIST: CPT | Mod: 26,,, | Performed by: PATHOLOGY

## 2020-03-13 PROCEDURE — 99999 PR PBB SHADOW E&M-EST. PATIENT-LVL III: CPT | Mod: PBBFAC,,, | Performed by: OBSTETRICS & GYNECOLOGY

## 2020-03-13 PROCEDURE — 88141 PR  CYTOPATH CERV/VAG INTERPRET: ICD-10-PCS | Mod: ,,, | Performed by: PATHOLOGY

## 2020-03-13 PROCEDURE — 88305 TISSUE EXAM BY PATHOLOGIST: CPT | Performed by: PATHOLOGY

## 2020-03-13 PROCEDURE — 99999 PR PBB SHADOW E&M-EST. PATIENT-LVL III: ICD-10-PCS | Mod: PBBFAC,,, | Performed by: OBSTETRICS & GYNECOLOGY

## 2020-03-13 PROCEDURE — 88305 TISSUE EXAM BY PATHOLOGIST: ICD-10-PCS | Mod: 26,,, | Performed by: PATHOLOGY

## 2020-03-13 PROCEDURE — 58100 BIOPSY OF UTERUS LINING: CPT | Mod: PBBFAC,PO | Performed by: OBSTETRICS & GYNECOLOGY

## 2020-03-13 PROCEDURE — 58100 PR BIOPSY OF UTERUS LINING: ICD-10-PCS | Mod: S$PBB,,, | Performed by: OBSTETRICS & GYNECOLOGY

## 2020-03-13 PROCEDURE — 88175 CYTOPATH C/V AUTO FLUID REDO: CPT | Performed by: PATHOLOGY

## 2020-03-13 PROCEDURE — 99203 PR OFFICE/OUTPT VISIT, NEW, LEVL III, 30-44 MIN: ICD-10-PCS | Mod: 25,S$PBB,, | Performed by: OBSTETRICS & GYNECOLOGY

## 2020-03-13 NOTE — PROGRESS NOTES
"48 yo perimenopausal female who presents to day to discuss "a cyst".  Reports that recent CT of abdomen showed cyst.  However, when report was read by me - it actually described uterine fibroids.  Patient reports that cycles have been irregular x 1 yr. They can go for months. However, when they do come, they can be quite heavy.  Last pap was about 2 yrs ago. wnl per the patient.  She has never had a mammgram.    ROS:  GENERAL: Denies weight gain or weight loss. Feeling well overall.   SKIN: Denies rash or lesions.   HEAD: Denies head injury or headache.   NODES: Denies enlarged lymph nodes.   CHEST: Denies chest pain or shortness of breath.   CARDIOVASCULAR: Denies palpitations or left sided chest pain.   ABDOMEN: No abdominal pain, constipation, diarrhea, nausea, vomiting or rectal bleeding.   URINARY: No frequency, dysuria, hematuria, or burning on urination.  REPRODUCTIVE: See HPI.   BREASTS: The patient performs breast self-examination and denies pain, lumps, or nipple discharge.   HEMATOLOGIC: No easy bruisability or excessive bleeding.   MUSCULOSKELETAL: Denies joint pain or swelling.   NEUROLOGIC: Denies syncope or weakness.   PSYCHIATRIC: Denies depression, anxiety or mood swings.         PE:   Vitals: BP (!) 130/90 (BP Location: Left arm)   Ht 4' 9" (1.448 m)   Wt 92.9 kg (204 lb 12.9 oz)   LMP 02/23/2020   BMI 44.32 kg/m²   APPEARANCE: Well nourished, well developed, in no acute distress.  SKIN: Normal skin turgor, no lesions.  CHEST: Lungs clear to auscultation.  HEART: Regular rate and rhythm, no murmurs, rubs or gallops.  ABDOMEN: Soft. No tenderness or masses. No hepatosplenomegaly. No hernias. obese  BREASTS: Symmetrical, no skin changes or visible lesions. No palpable masses, nipple discharge or adenopathy bilaterally.  PELVIC: Normal external female genitalia without lesions. Normal hair distribution. Adequate perineal body, normal urethral meatus. Vagina moist and well rugated without lesions " or discharge. Cervix pink and without lesions. No significant cystocele or rectocele. Bimanual exam showed uterus normal size, shape, position, mobile and nontender. Adnexa without masses or tenderness. Urethra and bladder normal.    Date:3/13/2020  Time:10:41 AM  Name of the procedure: Endometrial Biopsy  Indications: Clayton Sandoval is a 49 y.o. female  who presents today for endometrial biopsy secondary to abnormal uterine bleeding.  Patient's last menstrual period was 2020..    Patient consent: Risks/benefits of the procedure were discussed with the patient. Patient's questions were answered.  Consents signed.   TIME OUT completed.  Labs:     Procedure: Speculum placed in vagina; Pap collected; cervix swabbed with betadine x 1; Single tooth tenaculum placed on anterior cervix.  Endometrial pipelle advanced without difficulty x 3 passes;  Sounded to about 6-7cm;  single tooth tenaculum removed.  Hemostasis achieved.  Complications: none  EBL: min  Disposition: Pt tolerated the procedure well.      AP: Abnormal uterine bleeding  -likely due to perimenopausal status vs. Fibroids vs. Hyperplasia  -s/p BTL  -pap collected  -Endometrial biopsy collected  -pelvic US ordered    Mammogram ordered    F/u in 2 wks for gyn visit    s imelda NEWTON

## 2020-03-24 LAB
FINAL PATHOLOGIC DIAGNOSIS: NORMAL
GROSS: NORMAL

## 2020-03-31 ENCOUNTER — TELEPHONE (OUTPATIENT)
Dept: OBSTETRICS AND GYNECOLOGY | Facility: HOSPITAL | Age: 50
End: 2020-03-31

## 2020-03-31 DIAGNOSIS — N93.9 ABNORMAL UTERINE BLEEDING (AUB): Primary | ICD-10-CM

## 2020-03-31 RX ORDER — MEDROXYPROGESTERONE ACETATE 5 MG/1
5 TABLET ORAL DAILY
Qty: 30 TABLET | Refills: 12 | Status: SHIPPED | OUTPATIENT
Start: 2020-03-31 | End: 2020-06-09 | Stop reason: ALTCHOICE

## 2020-03-31 NOTE — TELEPHONE ENCOUNTER
I spoke to the patient today.  She was unable to come in for her visit yesterday to discuss abnormal uterine bleeding.    Endometrial biopsy completed showed proliferative endometrium. No evidence of malignancy or hyperplasia.  Results discussed with the patient.    She reports that she has not had a cycle since her last visit here.    I recommend taking a daily progesterone to decrease the risk of abnormal uterine tissue development until she turns 50 yrs old.    The patient agrees. rx for provera 5mg PO daily given.    Patient instructed to call the office if any problems with AUB once starting the medication.    Should follow p in Dec 2020 to discuss plan after she turns 50 yrs old.    ashwini segura MD

## 2020-04-06 LAB
FINAL PATHOLOGIC DIAGNOSIS: NORMAL
Lab: NORMAL

## 2020-05-27 ENCOUNTER — TELEPHONE (OUTPATIENT)
Dept: OBSTETRICS AND GYNECOLOGY | Facility: CLINIC | Age: 50
End: 2020-05-27

## 2020-05-27 ENCOUNTER — HOSPITAL ENCOUNTER (OUTPATIENT)
Dept: RADIOLOGY | Facility: HOSPITAL | Age: 50
Discharge: HOME | End: 2020-05-27
Attending: OBSTETRICS & GYNECOLOGY
Payer: MEDICAID

## 2020-05-27 DIAGNOSIS — Z12.31 VISIT FOR SCREENING MAMMOGRAM: ICD-10-CM

## 2020-05-27 PROCEDURE — 77067 SCR MAMMO BI INCL CAD: CPT | Mod: TC

## 2020-05-27 PROCEDURE — 77063 BREAST TOMOSYNTHESIS BI: CPT | Mod: 26,,, | Performed by: RADIOLOGY

## 2020-05-27 PROCEDURE — 77067 MAMMO DIGITAL SCREENING BILAT WITH TOMOSYNTHESIS_CAD: ICD-10-PCS | Mod: 26,,, | Performed by: RADIOLOGY

## 2020-05-27 PROCEDURE — 77067 SCR MAMMO BI INCL CAD: CPT | Mod: 26,,, | Performed by: RADIOLOGY

## 2020-05-27 PROCEDURE — 77063 MAMMO DIGITAL SCREENING BILAT WITH TOMOSYNTHESIS_CAD: ICD-10-PCS | Mod: 26,,, | Performed by: RADIOLOGY

## 2020-05-27 NOTE — TELEPHONE ENCOUNTER
Called pt back and rescheduled missed appointments from 3/30/2020 to 6/9/2020 u/s at 7:30 a.m and a gyn with you at 8:30 a.m. Please advise

## 2020-05-27 NOTE — TELEPHONE ENCOUNTER
----- Message from Shell Romero MA sent at 5/27/2020  9:05 AM CDT -----  Contact: [phone]-self      ----- Message -----  From: Laura Roper  Sent: 5/27/2020   9:01 AM CDT  To: Jamal MEHTA Staff    Patient is requesting a call back concerning pt would like to reschedule her Ultrasound that she missed on 3/30. Please call

## 2020-06-09 ENCOUNTER — PROCEDURE VISIT (OUTPATIENT)
Dept: OBSTETRICS AND GYNECOLOGY | Facility: CLINIC | Age: 50
End: 2020-06-09
Payer: MEDICAID

## 2020-06-09 ENCOUNTER — OFFICE VISIT (OUTPATIENT)
Dept: OBSTETRICS AND GYNECOLOGY | Facility: CLINIC | Age: 50
End: 2020-06-09
Payer: MEDICAID

## 2020-06-09 VITALS
SYSTOLIC BLOOD PRESSURE: 152 MMHG | DIASTOLIC BLOOD PRESSURE: 98 MMHG | WEIGHT: 208.31 LBS | BODY MASS INDEX: 45.08 KG/M2

## 2020-06-09 DIAGNOSIS — N93.9 ABNORMAL UTERINE BLEEDING (AUB): ICD-10-CM

## 2020-06-09 DIAGNOSIS — N93.9 ABNORMAL UTERINE BLEEDING (AUB): Primary | ICD-10-CM

## 2020-06-09 PROCEDURE — 99213 OFFICE O/P EST LOW 20 MIN: CPT | Mod: PBBFAC,PO,25 | Performed by: OBSTETRICS & GYNECOLOGY

## 2020-06-09 PROCEDURE — 99999 PR PBB SHADOW E&M-EST. PATIENT-LVL III: CPT | Mod: PBBFAC,,, | Performed by: OBSTETRICS & GYNECOLOGY

## 2020-06-09 PROCEDURE — 99212 PR OFFICE/OUTPT VISIT, EST, LEVL II, 10-19 MIN: ICD-10-PCS | Mod: S$PBB,25,, | Performed by: OBSTETRICS & GYNECOLOGY

## 2020-06-09 PROCEDURE — 99999 PR PBB SHADOW E&M-EST. PATIENT-LVL III: ICD-10-PCS | Mod: PBBFAC,,, | Performed by: OBSTETRICS & GYNECOLOGY

## 2020-06-09 PROCEDURE — 99212 OFFICE O/P EST SF 10 MIN: CPT | Mod: S$PBB,25,, | Performed by: OBSTETRICS & GYNECOLOGY

## 2020-06-09 PROCEDURE — 76830 TRANSVAGINAL US NON-OB: CPT | Mod: PBBFAC,PO

## 2020-06-09 PROCEDURE — 76830 PR  ECHOGRAPHY,TRANSVAGINAL: ICD-10-PCS | Mod: 26,S$PBB,, | Performed by: OBSTETRICS & GYNECOLOGY

## 2020-06-09 PROCEDURE — 76830 TRANSVAGINAL US NON-OB: CPT | Mod: 26,S$PBB,, | Performed by: OBSTETRICS & GYNECOLOGY

## 2020-06-09 RX ORDER — NORETHINDRONE ACETATE AND ETHINYL ESTRADIOL 1.5-30(21)
1 KIT ORAL DAILY
Qty: 30 TABLET | Refills: 11 | Status: SHIPPED | OUTPATIENT
Start: 2020-06-09 | End: 2021-02-19 | Stop reason: CLARIF

## 2020-06-29 ENCOUNTER — CLINICAL SUPPORT (OUTPATIENT)
Dept: URGENT CARE | Facility: CLINIC | Age: 50
End: 2020-06-29
Payer: MEDICAID

## 2020-06-29 VITALS — TEMPERATURE: 98 F | HEART RATE: 98 BPM

## 2020-06-29 DIAGNOSIS — Z11.9 SCREENING EXAMINATION FOR INFECTIOUS DISEASE: Primary | ICD-10-CM

## 2020-06-29 PROCEDURE — U0003 INFECTIOUS AGENT DETECTION BY NUCLEIC ACID (DNA OR RNA); SEVERE ACUTE RESPIRATORY SYNDROME CORONAVIRUS 2 (SARS-COV-2) (CORONAVIRUS DISEASE [COVID-19]), AMPLIFIED PROBE TECHNIQUE, MAKING USE OF HIGH THROUGHPUT TECHNOLOGIES AS DESCRIBED BY CMS-2020-01-R: HCPCS

## 2020-06-29 PROCEDURE — 99211 OFF/OP EST MAY X REQ PHY/QHP: CPT | Mod: S$GLB,,, | Performed by: PHYSICIAN ASSISTANT

## 2020-06-29 PROCEDURE — 99211 PR OFFICE/OUTPT VISIT, EST, LEVL I: ICD-10-PCS | Mod: S$GLB,,, | Performed by: PHYSICIAN ASSISTANT

## 2020-07-03 LAB — SARS-COV-2 RNA RESP QL NAA+PROBE: NOT DETECTED

## 2020-07-04 ENCOUNTER — HOSPITAL ENCOUNTER (EMERGENCY)
Facility: HOSPITAL | Age: 50
Discharge: HOME | End: 2020-07-04
Attending: EMERGENCY MEDICINE
Payer: MEDICAID

## 2020-07-04 ENCOUNTER — TELEPHONE (OUTPATIENT)
Dept: URGENT CARE | Facility: CLINIC | Age: 50
End: 2020-07-04

## 2020-07-04 VITALS
DIASTOLIC BLOOD PRESSURE: 99 MMHG | BODY MASS INDEX: 39.06 KG/M2 | TEMPERATURE: 98 F | OXYGEN SATURATION: 99 % | RESPIRATION RATE: 18 BRPM | WEIGHT: 200 LBS | SYSTOLIC BLOOD PRESSURE: 160 MMHG | HEART RATE: 91 BPM

## 2020-07-04 DIAGNOSIS — G43.809 OTHER MIGRAINE WITHOUT STATUS MIGRAINOSUS, NOT INTRACTABLE: Primary | ICD-10-CM

## 2020-07-04 LAB
B-HCG UR QL: NEGATIVE
CTP QC/QA: YES

## 2020-07-04 PROCEDURE — 96374 THER/PROPH/DIAG INJ IV PUSH: CPT

## 2020-07-04 PROCEDURE — 81025 URINE PREGNANCY TEST: CPT | Performed by: NURSE PRACTITIONER

## 2020-07-04 PROCEDURE — 96375 TX/PRO/DX INJ NEW DRUG ADDON: CPT

## 2020-07-04 PROCEDURE — 63600175 PHARM REV CODE 636 W HCPCS: Performed by: NURSE PRACTITIONER

## 2020-07-04 PROCEDURE — 25000003 PHARM REV CODE 250: Performed by: NURSE PRACTITIONER

## 2020-07-04 PROCEDURE — 99284 EMERGENCY DEPT VISIT MOD MDM: CPT | Mod: 25

## 2020-07-04 RX ORDER — BUTALBITAL, ACETAMINOPHEN AND CAFFEINE 50; 325; 40 MG/1; MG/1; MG/1
1 TABLET ORAL EVERY 4 HOURS PRN
Qty: 12 TABLET | Refills: 0 | Status: SHIPPED | OUTPATIENT
Start: 2020-07-04 | End: 2020-08-03

## 2020-07-04 RX ORDER — METOCLOPRAMIDE HYDROCHLORIDE 5 MG/ML
10 INJECTION INTRAMUSCULAR; INTRAVENOUS
Status: COMPLETED | OUTPATIENT
Start: 2020-07-04 | End: 2020-07-04

## 2020-07-04 RX ORDER — DIPHENHYDRAMINE HYDROCHLORIDE 50 MG/ML
25 INJECTION INTRAMUSCULAR; INTRAVENOUS
Status: COMPLETED | OUTPATIENT
Start: 2020-07-04 | End: 2020-07-04

## 2020-07-04 RX ORDER — KETOROLAC TROMETHAMINE 30 MG/ML
10 INJECTION, SOLUTION INTRAMUSCULAR; INTRAVENOUS
Status: COMPLETED | OUTPATIENT
Start: 2020-07-04 | End: 2020-07-04

## 2020-07-04 RX ADMIN — KETOROLAC TROMETHAMINE 10 MG: 30 INJECTION, SOLUTION INTRAMUSCULAR at 09:07

## 2020-07-04 RX ADMIN — DIPHENHYDRAMINE HYDROCHLORIDE 25 MG: 50 INJECTION, SOLUTION INTRAMUSCULAR; INTRAVENOUS at 09:07

## 2020-07-04 RX ADMIN — SODIUM CHLORIDE 500 ML: 0.9 INJECTION, SOLUTION INTRAVENOUS at 09:07

## 2020-07-04 RX ADMIN — METOCLOPRAMIDE 10 MG: 5 INJECTION, SOLUTION INTRAMUSCULAR; INTRAVENOUS at 09:07

## 2020-07-05 NOTE — DISCHARGE INSTRUCTIONS
Thank you for allowing me to care for you today.  I hope our treatment plan will make you feel better in the next few days.  In order for me to take better care of my future patients and improve our Emergency Department, I would appreciate if you can provide us with feedback.  In the next few days, you may receive a survey in the mail.  If you do, it would mean a great deal to me if you would please take the time to complete it.    Thank you and I hope you feel better.  Nicolle Calderon NP

## 2020-07-05 NOTE — ED PROVIDER NOTES
Encounter Date: 2020       History     Chief Complaint   Patient presents with    Migraine     Migraine x 3 days which she has a 20 yr hx of. Prescribed medication not working. Patient does not know name of medicine. 2.5 hours ago she took excedrine. No associated symnptoms.      The patient is a 49-year-old female with a past medical history of migraines who presents to the ED complaining of a migraine for the past 3 days.  Pain is localized unilaterally on the left.  She reports associated insomnia.  Patient reports having a 20 year history of migraines.  She does see a neurologist.  She typically takes Excedrin and Tylenol with relief.  She has tried both of these medications without improvement this week.  Denies fever, chills, visual changes, photophobia, phonophobia, nausea, vomiting, abdominal pain, numbness, weakness, tingling and other symptoms.  She reports that this migraine is typical of her regular headaches.  She is unsure of the names of other migraine medication she has tried in the past.  No other treatment attempted prior to arrival.    The history is provided by the patient. The history is limited by a language barrier. A  was used.     Review of patient's allergies indicates:  No Known Allergies  Past Medical History:   Diagnosis Date    Disorder of kidney and ureter     Migraine headache      Past Surgical History:   Procedure Laterality Date     SECTION      REDUCTION OF BOTH BREASTS Bilateral     TOTAL REDUCTION MAMMOPLASTY Bilateral     TUBAL LIGATION       Family History   Family history unknown: Yes     Social History     Tobacco Use    Smoking status: Current Every Day Smoker     Packs/day: 0.35     Types: Cigarettes    Smokeless tobacco: Never Used   Substance Use Topics    Alcohol use: No    Drug use: No     Review of Systems   Constitutional: Negative for fever.   HENT: Negative for sore throat.    Eyes: Negative for visual disturbance.    Respiratory: Negative for shortness of breath.    Cardiovascular: Negative for chest pain.   Gastrointestinal: Negative for nausea.   Genitourinary: Negative for dysuria.   Musculoskeletal: Negative for back pain.   Skin: Negative for rash.   Neurological: Positive for headaches. Negative for weakness and numbness.   Hematological: Does not bruise/bleed easily.       Physical Exam     Initial Vitals [07/04/20 1951]   BP Pulse Resp Temp SpO2   (!) 188/96 83 18 98.4 °F (36.9 °C) 97 %      MAP       --         Physical Exam    Nursing note and vitals reviewed.  Constitutional: She appears well-developed and well-nourished.  Non-toxic appearance. No distress.   The patient is alert, oriented, and nontoxic appearing.  No apparent distress.   HENT:   Head: Normocephalic and atraumatic.   Right Ear: Hearing and external ear normal.   Left Ear: Hearing and external ear normal.   Nose: Nose abnormal.   Mouth/Throat: Uvula is midline, oropharynx is clear and moist and mucous membranes are normal. No tonsillar abscesses.   No perceptible facial asymmetry   Eyes: Conjunctivae and EOM are normal. Pupils are equal, round, and reactive to light. Right conjunctiva is not injected. Left conjunctiva is not injected. Right eye exhibits normal extraocular motion and no nystagmus. Left eye exhibits normal extraocular motion and no nystagmus. Right pupil is round and reactive. Left pupil is round and reactive. Pupils are equal.   Neck: Full passive range of motion without pain. Neck supple. No neck rigidity.   No meningismus   Cardiovascular: Normal rate, regular rhythm, normal heart sounds and normal pulses. Exam reveals no gallop and no friction rub.    No murmur heard.  Pulses:       Radial pulses are 2+ on the right side and 2+ on the left side.        Dorsalis pedis pulses are 2+ on the right side and 2+ on the left side.   Pulmonary/Chest: Effort normal and breath sounds normal. No respiratory distress. She has no decreased  breath sounds. She has no wheezes. She has no rhonchi. She has no rales.   Abdominal: Soft. Bowel sounds are normal. She exhibits no distension. There is no abdominal tenderness.   Musculoskeletal: Normal range of motion.   Neurological: She is alert and oriented to person, place, and time. She has normal strength. No cranial nerve deficit or sensory deficit. She exhibits normal muscle tone. She displays no seizure activity. Gait normal. GCS eye subscore is 4. GCS verbal subscore is 5. GCS motor subscore is 6.   No sensory deficits.  Muscular strength is normal and equal bilaterally.  No pronator drift.  Gait is steady and even.  Finger to nose, heel to shin, and alternating hands are all within normal limits.   Skin: Skin is warm, dry and intact. Capillary refill takes less than 2 seconds. No rash noted.   Psychiatric: She has a normal mood and affect. Her speech is normal and behavior is normal. Judgment and thought content normal. Cognition and memory are normal.         ED Course   Procedures  Labs Reviewed   POCT URINE PREGNANCY          Imaging Results    None          Medical Decision Making:   History:   Old Medical Records: I decided to obtain old medical records.  Clinical Tests:   Lab Tests: Ordered and Reviewed  ED Management:  After complete evaluation, including thorough history and physical exam, the patient's symptoms are most likely due to primary headache syndrome (including, but not limited to, tension/cluster/migraine headache). The patient's headaches are similar in character to prior. The history does not suggest sudden/maximal onset of pain consistent with SAH/intracranial bleed. Physical exam is benign without focal weakness, sensory deficit, or cerebellar signs to suggest stroke or intracranial mass. There is no meningismus, fever, or evidence of infection to suggest meningitis/encephalitis. The patient was treated with Toradol, Reglan, and Benadryl and improved.  Patient will be discharged  with a small number of Fioricet and instructed to follow-up with her PCP and neurologist.  All questions answered.  Strict return precautions have been discussed.                                 Clinical Impression:       ICD-10-CM ICD-9-CM   1. Other migraine without status migrainosus, not intractable  G43.809 346.80                                Nicolle Calderon NP  07/04/20 2301

## 2020-07-05 NOTE — ED TRIAGE NOTES
Pt reports via Dr. Edwards that she has been having a migraine X 3days. Pt ststaes she has meds previously prescribes but they aren't working. OTC medicine. excedrine taken today with no relief. No other symptoms presents at this time. Will continue to monitor.

## 2020-09-11 ENCOUNTER — OFFICE VISIT (OUTPATIENT)
Dept: OBSTETRICS AND GYNECOLOGY | Facility: CLINIC | Age: 50
End: 2020-09-11
Payer: MEDICAID

## 2020-09-11 VITALS
WEIGHT: 208.13 LBS | DIASTOLIC BLOOD PRESSURE: 80 MMHG | BODY MASS INDEX: 40.64 KG/M2 | SYSTOLIC BLOOD PRESSURE: 117 MMHG

## 2020-09-11 DIAGNOSIS — N93.9 ABNORMAL UTERINE BLEEDING (AUB): Primary | ICD-10-CM

## 2020-09-11 PROCEDURE — 99212 OFFICE O/P EST SF 10 MIN: CPT | Mod: S$PBB,,, | Performed by: OBSTETRICS & GYNECOLOGY

## 2020-09-11 PROCEDURE — 99213 OFFICE O/P EST LOW 20 MIN: CPT | Mod: PBBFAC,PO | Performed by: OBSTETRICS & GYNECOLOGY

## 2020-09-11 PROCEDURE — 99999 PR PBB SHADOW E&M-EST. PATIENT-LVL III: CPT | Mod: PBBFAC,,, | Performed by: OBSTETRICS & GYNECOLOGY

## 2020-09-11 PROCEDURE — 99212 PR OFFICE/OUTPT VISIT, EST, LEVL II, 10-19 MIN: ICD-10-PCS | Mod: S$PBB,,, | Performed by: OBSTETRICS & GYNECOLOGY

## 2020-09-11 PROCEDURE — 99999 PR PBB SHADOW E&M-EST. PATIENT-LVL III: ICD-10-PCS | Mod: PBBFAC,,, | Performed by: OBSTETRICS & GYNECOLOGY

## 2020-09-11 NOTE — PROGRESS NOTES
50 yo perimenopausal female who presented in 3/2020 to discuss cyst on her ovaries.  Patient also complains of irregular cycles, hot flashes, vaginal dryness, decreased sexual desire.    Pelvic US today shows uterus [id number] with endometrial stripe at 2.4mm  2 small uterine fibroids No problems with ovaries    Endometrial biopys completed at last visit shows proliferative endometrial tissue.    Patient was started on junel 1/30 in June 2020.  Only used medication for one month because she didn't realize that she had a year supply of medication.  Per the patient, no adverse side effects.  Reports that hot flashes and vaginal dryness and sexual desire improved a little bit.    Explained to patient that goal was to take medication for 3 months (not one month) to see if her symptoms improved.  Recommend restarting OCPs.  I called the pharmacy - they have the rx available.    Patient wants a new PCP. Concerned about her bones. Referred to [facility].    F/u in 1 yr    ashwini segura MD

## 2021-02-19 ENCOUNTER — HOSPITAL ENCOUNTER (EMERGENCY)
Facility: HOSPITAL | Age: 51
Discharge: HOME | End: 2021-02-19
Attending: EMERGENCY MEDICINE
Payer: MEDICAID

## 2021-02-19 VITALS
RESPIRATION RATE: 16 BRPM | HEIGHT: 59 IN | BODY MASS INDEX: 41.93 KG/M2 | OXYGEN SATURATION: 99 % | TEMPERATURE: 99 F | SYSTOLIC BLOOD PRESSURE: 169 MMHG | WEIGHT: 208 LBS | HEART RATE: 76 BPM | DIASTOLIC BLOOD PRESSURE: 112 MMHG

## 2021-02-19 DIAGNOSIS — R51.9 NONINTRACTABLE HEADACHE, UNSPECIFIED CHRONICITY PATTERN, UNSPECIFIED HEADACHE TYPE: Primary | ICD-10-CM

## 2021-02-19 LAB
ALBUMIN SERPL BCP-MCNC: 3.5 G/DL (ref 3.5–5.2)
ALP SERPL-CCNC: 126 U/L (ref 55–135)
ALT SERPL W/O P-5'-P-CCNC: 36 U/L (ref 10–44)
ANION GAP SERPL CALC-SCNC: 8 MMOL/L (ref 8–16)
AST SERPL-CCNC: 32 U/L (ref 10–40)
BASOPHILS # BLD AUTO: 0.08 K/UL (ref 0–0.2)
BASOPHILS NFR BLD: 0.7 % (ref 0–1.9)
BILIRUB SERPL-MCNC: 0.3 MG/DL (ref 0.1–1)
BUN SERPL-MCNC: 9 MG/DL (ref 6–20)
CALCIUM SERPL-MCNC: 9.4 MG/DL (ref 8.7–10.5)
CHLORIDE SERPL-SCNC: 105 MMOL/L (ref 95–110)
CO2 SERPL-SCNC: 25 MMOL/L (ref 23–29)
CREAT SERPL-MCNC: 0.6 MG/DL (ref 0.5–1.4)
DIFFERENTIAL METHOD: ABNORMAL
EOSINOPHIL # BLD AUTO: 0.3 K/UL (ref 0–0.5)
EOSINOPHIL NFR BLD: 2.5 % (ref 0–8)
ERYTHROCYTE [DISTWIDTH] IN BLOOD BY AUTOMATED COUNT: 15.1 % (ref 11.5–14.5)
EST. GFR  (AFRICAN AMERICAN): >60 ML/MIN/1.73 M^2
EST. GFR  (NON AFRICAN AMERICAN): >60 ML/MIN/1.73 M^2
GLUCOSE SERPL-MCNC: 97 MG/DL (ref 70–110)
HCT VFR BLD AUTO: 46.4 % (ref 37–48.5)
HGB BLD-MCNC: 15.2 G/DL (ref 12–16)
IMM GRANULOCYTES # BLD AUTO: 0.07 K/UL (ref 0–0.04)
IMM GRANULOCYTES NFR BLD AUTO: 0.6 % (ref 0–0.5)
LYMPHOCYTES # BLD AUTO: 2.7 K/UL (ref 1–4.8)
LYMPHOCYTES NFR BLD: 25.1 % (ref 18–48)
MCH RBC QN AUTO: 27.6 PG (ref 27–31)
MCHC RBC AUTO-ENTMCNC: 32.8 G/DL (ref 32–36)
MCV RBC AUTO: 84 FL (ref 82–98)
MONOCYTES # BLD AUTO: 0.9 K/UL (ref 0.3–1)
MONOCYTES NFR BLD: 8.5 % (ref 4–15)
NEUTROPHILS # BLD AUTO: 6.8 K/UL (ref 1.8–7.7)
NEUTROPHILS NFR BLD: 62.6 % (ref 38–73)
NRBC BLD-RTO: 0 /100 WBC
PLATELET # BLD AUTO: 302 K/UL (ref 150–350)
PMV BLD AUTO: 11.8 FL (ref 9.2–12.9)
POTASSIUM SERPL-SCNC: 3.9 MMOL/L (ref 3.5–5.1)
PROT SERPL-MCNC: 7.6 G/DL (ref 6–8.4)
RBC # BLD AUTO: 5.5 M/UL (ref 4–5.4)
SODIUM SERPL-SCNC: 138 MMOL/L (ref 136–145)
TROPONIN I SERPL DL<=0.01 NG/ML-MCNC: <0.006 NG/ML (ref 0–0.03)
WBC # BLD AUTO: 10.84 K/UL (ref 3.9–12.7)

## 2021-02-19 PROCEDURE — 99284 EMERGENCY DEPT VISIT MOD MDM: CPT | Mod: 25

## 2021-02-19 PROCEDURE — 85025 COMPLETE CBC W/AUTO DIFF WBC: CPT

## 2021-02-19 PROCEDURE — 25000003 PHARM REV CODE 250: Performed by: PHYSICIAN ASSISTANT

## 2021-02-19 PROCEDURE — 84484 ASSAY OF TROPONIN QUANT: CPT

## 2021-02-19 PROCEDURE — 80053 COMPREHEN METABOLIC PANEL: CPT

## 2021-02-19 PROCEDURE — 96375 TX/PRO/DX INJ NEW DRUG ADDON: CPT

## 2021-02-19 PROCEDURE — 96374 THER/PROPH/DIAG INJ IV PUSH: CPT

## 2021-02-19 PROCEDURE — 96361 HYDRATE IV INFUSION ADD-ON: CPT

## 2021-02-19 PROCEDURE — 63600175 PHARM REV CODE 636 W HCPCS: Performed by: PHYSICIAN ASSISTANT

## 2021-02-19 RX ORDER — BUTALBITAL, ACETAMINOPHEN AND CAFFEINE 50; 325; 40 MG/1; MG/1; MG/1
1 TABLET ORAL EVERY 6 HOURS PRN
Qty: 20 TABLET | Refills: 0 | Status: SHIPPED | OUTPATIENT
Start: 2021-02-19 | End: 2021-03-21

## 2021-02-19 RX ORDER — DEXAMETHASONE SODIUM PHOSPHATE 4 MG/ML
4 INJECTION, SOLUTION INTRA-ARTICULAR; INTRALESIONAL; INTRAMUSCULAR; INTRAVENOUS; SOFT TISSUE
Status: COMPLETED | OUTPATIENT
Start: 2021-02-19 | End: 2021-02-19

## 2021-02-19 RX ORDER — METOCLOPRAMIDE HYDROCHLORIDE 5 MG/ML
5 INJECTION INTRAMUSCULAR; INTRAVENOUS
Status: COMPLETED | OUTPATIENT
Start: 2021-02-19 | End: 2021-02-19

## 2021-02-19 RX ORDER — AMLODIPINE BESYLATE 2.5 MG/1
2.5 TABLET ORAL DAILY
COMMUNITY
End: 2022-12-22

## 2021-02-19 RX ORDER — KETOROLAC TROMETHAMINE 30 MG/ML
15 INJECTION, SOLUTION INTRAMUSCULAR; INTRAVENOUS
Status: COMPLETED | OUTPATIENT
Start: 2021-02-19 | End: 2021-02-19

## 2021-02-19 RX ADMIN — METOCLOPRAMIDE 5 MG: 5 INJECTION, SOLUTION INTRAMUSCULAR; INTRAVENOUS at 01:02

## 2021-02-19 RX ADMIN — KETOROLAC TROMETHAMINE 15 MG: 30 INJECTION, SOLUTION INTRAMUSCULAR at 01:02

## 2021-02-19 RX ADMIN — DEXAMETHASONE SODIUM PHOSPHATE 4 MG: 4 INJECTION, SOLUTION INTRAMUSCULAR; INTRAVENOUS at 03:02

## 2021-02-19 RX ADMIN — SODIUM CHLORIDE 1000 ML: 0.9 INJECTION, SOLUTION INTRAVENOUS at 01:02

## 2021-03-19 ENCOUNTER — OFFICE VISIT (OUTPATIENT)
Dept: OBSTETRICS AND GYNECOLOGY | Facility: CLINIC | Age: 51
End: 2021-03-19
Payer: MEDICAID

## 2021-03-19 VITALS
DIASTOLIC BLOOD PRESSURE: 80 MMHG | BODY MASS INDEX: 42.93 KG/M2 | SYSTOLIC BLOOD PRESSURE: 124 MMHG | WEIGHT: 212.94 LBS

## 2021-03-19 DIAGNOSIS — R23.2 HOT FLASHES: Primary | ICD-10-CM

## 2021-03-19 PROCEDURE — 99999 PR PBB SHADOW E&M-EST. PATIENT-LVL III: ICD-10-PCS | Mod: PBBFAC,,, | Performed by: OBSTETRICS & GYNECOLOGY

## 2021-03-19 PROCEDURE — 99999 PR PBB SHADOW E&M-EST. PATIENT-LVL III: CPT | Mod: PBBFAC,,, | Performed by: OBSTETRICS & GYNECOLOGY

## 2021-03-19 PROCEDURE — 99212 PR OFFICE/OUTPT VISIT, EST, LEVL II, 10-19 MIN: ICD-10-PCS | Mod: S$PBB,,, | Performed by: OBSTETRICS & GYNECOLOGY

## 2021-03-19 PROCEDURE — 99212 OFFICE O/P EST SF 10 MIN: CPT | Mod: S$PBB,,, | Performed by: OBSTETRICS & GYNECOLOGY

## 2021-03-19 PROCEDURE — 99213 OFFICE O/P EST LOW 20 MIN: CPT | Mod: PBBFAC,PO | Performed by: OBSTETRICS & GYNECOLOGY

## 2021-03-19 RX ORDER — PROPRANOLOL HYDROCHLORIDE 120 MG/1
240 CAPSULE, EXTENDED RELEASE ORAL DAILY
COMMUNITY
Start: 2021-03-08 | End: 2023-01-19 | Stop reason: SDUPTHER

## 2021-03-19 RX ORDER — ESTRADIOL AND NORETHINDRONE ACETATE 1; .5 MG/1; MG/1
1 TABLET ORAL DAILY
Qty: 30 TABLET | Refills: 11 | Status: SHIPPED | OUTPATIENT
Start: 2021-03-19 | End: 2022-03-19

## 2021-03-19 RX ORDER — AMLODIPINE BESYLATE 5 MG/1
5 TABLET ORAL DAILY
COMMUNITY
Start: 2021-03-08 | End: 2023-01-19 | Stop reason: SDUPTHER

## 2021-03-31 ENCOUNTER — IMMUNIZATION (OUTPATIENT)
Dept: PHARMACY | Facility: CLINIC | Age: 51
End: 2021-03-31
Payer: MEDICAID

## 2021-03-31 DIAGNOSIS — Z23 NEED FOR VACCINATION: Primary | ICD-10-CM

## 2021-04-28 ENCOUNTER — IMMUNIZATION (OUTPATIENT)
Dept: PHARMACY | Facility: CLINIC | Age: 51
End: 2021-04-28

## 2021-04-28 DIAGNOSIS — Z23 NEED FOR VACCINATION: Primary | ICD-10-CM

## 2021-05-19 DIAGNOSIS — Z12.31 VISIT FOR SCREENING MAMMOGRAM: Primary | ICD-10-CM

## 2021-06-16 ENCOUNTER — TELEPHONE (OUTPATIENT)
Dept: OBSTETRICS AND GYNECOLOGY | Facility: CLINIC | Age: 51
End: 2021-06-16

## 2021-06-17 ENCOUNTER — TELEPHONE (OUTPATIENT)
Dept: OBSTETRICS AND GYNECOLOGY | Facility: CLINIC | Age: 51
End: 2021-06-17

## 2021-06-19 ENCOUNTER — HOSPITAL ENCOUNTER (OUTPATIENT)
Dept: RADIOLOGY | Facility: HOSPITAL | Age: 51
Discharge: HOME | End: 2021-06-19
Attending: OBSTETRICS & GYNECOLOGY
Payer: MEDICAID

## 2021-06-19 VITALS — BODY MASS INDEX: 43.01 KG/M2 | HEIGHT: 59 IN

## 2021-06-19 DIAGNOSIS — Z12.31 VISIT FOR SCREENING MAMMOGRAM: ICD-10-CM

## 2021-06-19 PROCEDURE — 77067 SCR MAMMO BI INCL CAD: CPT | Mod: 26,,, | Performed by: RADIOLOGY

## 2021-06-19 PROCEDURE — 77067 MAMMO DIGITAL SCREENING BILAT WITH TOMO: ICD-10-PCS | Mod: 26,,, | Performed by: RADIOLOGY

## 2021-06-19 PROCEDURE — 77067 SCR MAMMO BI INCL CAD: CPT | Mod: TC

## 2021-06-19 PROCEDURE — 77063 BREAST TOMOSYNTHESIS BI: CPT | Mod: 26,,, | Performed by: RADIOLOGY

## 2021-06-19 PROCEDURE — 77063 MAMMO DIGITAL SCREENING BILAT WITH TOMO: ICD-10-PCS | Mod: 26,,, | Performed by: RADIOLOGY

## 2021-07-01 ENCOUNTER — PATIENT MESSAGE (OUTPATIENT)
Dept: ADMINISTRATIVE | Facility: OTHER | Age: 51
End: 2021-07-01

## 2022-01-05 ENCOUNTER — IMMUNIZATION (OUTPATIENT)
Dept: PHARMACY | Facility: CLINIC | Age: 52
End: 2022-01-05
Payer: MEDICAID

## 2022-01-05 DIAGNOSIS — Z23 NEED FOR VACCINATION: Primary | ICD-10-CM

## 2022-06-27 ENCOUNTER — TELEPHONE (OUTPATIENT)
Dept: OBSTETRICS AND GYNECOLOGY | Facility: CLINIC | Age: 52
End: 2022-06-27
Payer: MEDICAID

## 2022-06-27 DIAGNOSIS — Z12.31 VISIT FOR SCREENING MAMMOGRAM: Primary | ICD-10-CM

## 2022-06-27 NOTE — TELEPHONE ENCOUNTER
----- Message from Ryan Whitt sent at 6/27/2022  3:27 PM CDT -----  Regarding: mammogram  Type:  Patient Returning Call    Who Called:patient     Who Left Message for Patient:letter    Does the patient know what this is regarding?:mammogram orders     Would the patient rather a call back or a response via [facility]? Call back     Best Call Back Number:[phone]  Additional Information: call in Nepalese

## 2022-06-27 NOTE — TELEPHONE ENCOUNTER
----- Message from Li Fletcher sent at 6/27/2022  3:15 PM CDT -----  Type:  Mammogram    Caller is requesting to schedule their annual mammogram appointment.  Order is not listed in EPIC.  Please enter order and contact patient to schedule.  Name of Caller: pt  Where would they like the mammogram performed? [facility]   Would the patient rather a call back or a response via [facility]?  Call   Best Call Back Number: [phone]  Additional Information:

## 2022-06-28 NOTE — TELEPHONE ENCOUNTER
Inform patient that mammogram order was placed.  She will need to call  to schedule.    She is due for annual gyn exam.    Please try to have it completed sometime before 2022 is over!    Dr Berry

## 2022-06-29 ENCOUNTER — TELEPHONE (OUTPATIENT)
Dept: ADMINISTRATIVE | Facility: OTHER | Age: 52
End: 2022-06-29
Payer: MEDICAID

## 2022-07-28 ENCOUNTER — HOSPITAL ENCOUNTER (OUTPATIENT)
Dept: RADIOLOGY | Facility: HOSPITAL | Age: 52
Discharge: HOME | End: 2022-07-28
Attending: OBSTETRICS & GYNECOLOGY
Payer: MEDICAID

## 2022-07-28 DIAGNOSIS — Z12.31 VISIT FOR SCREENING MAMMOGRAM: ICD-10-CM

## 2022-07-28 PROCEDURE — 77063 MAMMO DIGITAL SCREENING BILAT WITH TOMO: ICD-10-PCS | Mod: 26,,, | Performed by: RADIOLOGY

## 2022-07-28 PROCEDURE — 77063 BREAST TOMOSYNTHESIS BI: CPT | Mod: TC

## 2022-07-28 PROCEDURE — 77067 SCR MAMMO BI INCL CAD: CPT | Mod: 26,,, | Performed by: RADIOLOGY

## 2022-07-28 PROCEDURE — 77067 MAMMO DIGITAL SCREENING BILAT WITH TOMO: ICD-10-PCS | Mod: 26,,, | Performed by: RADIOLOGY

## 2022-07-28 PROCEDURE — 77063 BREAST TOMOSYNTHESIS BI: CPT | Mod: 26,,, | Performed by: RADIOLOGY

## 2022-08-14 ENCOUNTER — OFFICE VISIT (OUTPATIENT)
Dept: URGENT CARE | Facility: CLINIC | Age: 52
End: 2022-08-14
Payer: MEDICAID

## 2022-08-14 VITALS
DIASTOLIC BLOOD PRESSURE: 85 MMHG | OXYGEN SATURATION: 97 % | RESPIRATION RATE: 20 BRPM | TEMPERATURE: 99 F | WEIGHT: 190 LBS | HEART RATE: 81 BPM | HEIGHT: 61 IN | BODY MASS INDEX: 35.87 KG/M2 | SYSTOLIC BLOOD PRESSURE: 138 MMHG

## 2022-08-14 DIAGNOSIS — R05.9 COUGH: ICD-10-CM

## 2022-08-14 DIAGNOSIS — U07.1 COVID: Primary | ICD-10-CM

## 2022-08-14 DIAGNOSIS — J02.9 SORE THROAT: ICD-10-CM

## 2022-08-14 LAB
CTP QC/QA: YES
SARS-COV-2 RDRP RESP QL NAA+PROBE: POSITIVE

## 2022-08-14 PROCEDURE — 99214 OFFICE O/P EST MOD 30 MIN: CPT | Mod: S$GLB,,, | Performed by: PHYSICIAN ASSISTANT

## 2022-08-14 PROCEDURE — 3008F BODY MASS INDEX DOCD: CPT | Mod: CPTII,S$GLB,, | Performed by: PHYSICIAN ASSISTANT

## 2022-08-14 PROCEDURE — 1160F RVW MEDS BY RX/DR IN RCRD: CPT | Mod: CPTII,S$GLB,, | Performed by: PHYSICIAN ASSISTANT

## 2022-08-14 PROCEDURE — 99214 PR OFFICE/OUTPT VISIT, EST, LEVL IV, 30-39 MIN: ICD-10-PCS | Mod: S$GLB,,, | Performed by: PHYSICIAN ASSISTANT

## 2022-08-14 PROCEDURE — 3079F DIAST BP 80-89 MM HG: CPT | Mod: CPTII,S$GLB,, | Performed by: PHYSICIAN ASSISTANT

## 2022-08-14 PROCEDURE — U0002: ICD-10-PCS | Mod: QW,S$GLB,, | Performed by: PHYSICIAN ASSISTANT

## 2022-08-14 PROCEDURE — 1159F PR MEDICATION LIST DOCUMENTED IN MEDICAL RECORD: ICD-10-PCS | Mod: CPTII,S$GLB,, | Performed by: PHYSICIAN ASSISTANT

## 2022-08-14 PROCEDURE — 1160F PR REVIEW ALL MEDS BY PRESCRIBER/CLIN PHARMACIST DOCUMENTED: ICD-10-PCS | Mod: CPTII,S$GLB,, | Performed by: PHYSICIAN ASSISTANT

## 2022-08-14 PROCEDURE — 3075F PR MOST RECENT SYSTOLIC BLOOD PRESS GE 130-139MM HG: ICD-10-PCS | Mod: CPTII,S$GLB,, | Performed by: PHYSICIAN ASSISTANT

## 2022-08-14 PROCEDURE — 1159F MED LIST DOCD IN RCRD: CPT | Mod: CPTII,S$GLB,, | Performed by: PHYSICIAN ASSISTANT

## 2022-08-14 PROCEDURE — 3075F SYST BP GE 130 - 139MM HG: CPT | Mod: CPTII,S$GLB,, | Performed by: PHYSICIAN ASSISTANT

## 2022-08-14 PROCEDURE — U0002 COVID-19 LAB TEST NON-CDC: HCPCS | Mod: QW,S$GLB,, | Performed by: PHYSICIAN ASSISTANT

## 2022-08-14 PROCEDURE — 3008F PR BODY MASS INDEX (BMI) DOCUMENTED: ICD-10-PCS | Mod: CPTII,S$GLB,, | Performed by: PHYSICIAN ASSISTANT

## 2022-08-14 PROCEDURE — 3079F PR MOST RECENT DIASTOLIC BLOOD PRESSURE 80-89 MM HG: ICD-10-PCS | Mod: CPTII,S$GLB,, | Performed by: PHYSICIAN ASSISTANT

## 2022-08-14 RX ORDER — METFORMIN HYDROCHLORIDE 500 MG/1
500 TABLET ORAL 2 TIMES DAILY
COMMUNITY
Start: 2022-08-11

## 2022-08-14 RX ORDER — BENZONATATE 200 MG/1
200 CAPSULE ORAL 3 TIMES DAILY PRN
Qty: 30 CAPSULE | Refills: 0 | Status: SHIPPED | OUTPATIENT
Start: 2022-08-14 | End: 2022-08-24

## 2022-08-14 NOTE — LETTER
[address] [facility] ? [place], [address] ? Phone [phone] ? Fax [phone]           Return to Work/School    Patient: Clayton Sandoval  YOB: 1970   Date: 08/14/2022      To Whom It May Concern:     Clayton Sandoval was in contact with/seen in my office on 08/14/2022. COVID-19 is present in our communities across the [facility]. Not all patients are eligible or appropriate to be tested. In this situation, your employee meets the following criteria:     Clayton Sandoval has met the criteria for COVID-19 testing and has a POSITIVE result. She can return to work once they are asymptomatic for 24 hours without the use of fever reducing medications AND at least five days from the start of symptoms (or from the first positive result if they have no symptoms).      If you have any questions or concerns, or if I can be of further assistance, please do not hesitate to contact me.     Sincerely,      Jam Flores PA-C

## 2022-08-14 NOTE — PROGRESS NOTES
"Subjective:       Patient ID: Clayton Sandoval is a 51 y.o. female.    Vitals:  height is 5' 1" (1.549 m) and weight is 86.2 kg (190 lb). Her oral temperature is 98.5 °F (36.9 °C). Her blood pressure is 138/85 and her pulse is 81. Her respiration is 20 and oxygen saturation is 97%.     Chief Complaint: Cough    Pt requires , #[id number].     Pt c/o sore throat and cough since today.  Pt took vitamins since symptoms started.  Pt has had 3 doses of the COVID vaccine.  Denies SOB or difficulty breathing.      Cough  This is a new problem. The current episode started today. The cough is non-productive. Associated symptoms include a sore throat. Pertinent negatives include no chest pain, chills, fever, shortness of breath or wheezing. Risk factors for lung disease include smoking/tobacco exposure. She has tried OTC cough suppressant for the symptoms.       Constitution: Negative for chills, sweating, fatigue and fever.   HENT: Positive for sore throat.    Cardiovascular: Negative for chest pain.   Respiratory: Positive for cough. Negative for shortness of breath and wheezing.    Gastrointestinal: Negative for abdominal pain.       Objective:      Physical Exam   Constitutional: She is oriented to person, place, and time. She appears well-developed. She is cooperative.  Non-toxic appearance. She does not appear ill. No distress.   HENT:   Head: Normocephalic and atraumatic.   Ears:   Right Ear: Hearing, tympanic membrane, external ear and ear canal normal.   Left Ear: Hearing, tympanic membrane, external ear and ear canal normal.   Nose: Nose normal. No mucosal edema, rhinorrhea or nasal deformity. No epistaxis. Right sinus exhibits no maxillary sinus tenderness and no frontal sinus tenderness. Left sinus exhibits no maxillary sinus tenderness and no frontal sinus tenderness.   Mouth/Throat: Uvula is midline, oropharynx is clear and moist and mucous membranes are normal. No trismus in the jaw. Normal " dentition. No uvula swelling. No oropharyngeal exudate, posterior oropharyngeal edema or posterior oropharyngeal erythema.   Eyes: Conjunctivae and lids are normal. No scleral icterus.   Neck: Trachea normal and phonation normal. Neck supple. No edema present. No erythema present. No neck rigidity present.   Cardiovascular: Normal rate, regular rhythm, normal heart sounds and normal pulses.   Pulmonary/Chest: Effort normal and breath sounds normal. No stridor. No respiratory distress. She has no decreased breath sounds. She has no wheezes. She has no rhonchi. She has no rales.   Abdominal: Normal appearance.   Musculoskeletal: Normal range of motion.         General: No deformity. Normal range of motion.   Neurological: no focal deficit. She is alert and oriented to person, place, and time. She exhibits normal muscle tone. Coordination normal.      Comments: CN's grossly intact   Skin: Skin is warm, dry, intact, not diaphoretic and not pale.   Psychiatric: Her speech is normal and behavior is normal. Judgment and thought content normal.   Nursing note and vitals reviewed.    Results for orders placed or performed in visit on 08/14/22   POCT COVID-19 Rapid Screening   Result Value Ref Range    POC Rapid COVID Positive (A) Negative     Acceptable Yes        Results reviewed with pt      Assessment:       1. COVID    2. Cough    3. Sore throat          Plan:         COVID  -     nirmatrelvir-ritonavir 300 mg (150 mg x 2)-100 mg copackaged tablets (EUA); Take 3 tablets by mouth 2 (two) times daily. Each dose contains 2 nirmatrelvir (pink tablets) and 1 ritonavir (white tablet). Take all 3 tablets together  Dispense: 30 tablet; Refill: 0    Cough  -     POCT COVID-19 Rapid Screening  -     benzonatate (TESSALON) 200 MG capsule; Take 1 capsule (200 mg total) by mouth 3 (three) times daily as needed for Cough.  Dispense: 30 capsule; Refill: 0    Sore throat  -     POCT COVID-19 Rapid Screening        "    Medical Decision Making:   History:   Old Records Summarized: records from clinic visits.  Initial Assessment:   51 y.o. female with PMH HTN and DM diagnosed with COVID.  Due to risk factors including HTN and DM, pt prescribed Paxlovid.  Meds reviewed with pt and currently taking Amlodipine, Metformin, and Propranolol.  Due to drug interactions with Amlodipine, pt advised to take 1/2 pill and monitor BP.  If BP remains high, start taking full tab.  Go to ER if SOB or difficulty breathing.  COVID Risk Factor Score - 1    Urgent Care Management:  COVID test         Patient Instructions   You must understand that you've received an Urgent Care treatment only and that you may be released before all your medical problems are known or treated. You, the patient, will arrange for follow up care as instructed.    Follow up with your PCP or specialty clinic as directed in the next 1-2 weeks if not improved or as needed. You can call [phone] to schedule an appointment with the appropriate provider.    If your condition worsens we recommend that you receive another evaluation at the emergency room immediately or contact your primary medical clinic's after hours call service to discuss your concerns.    Please go to the Emergency Department for any concerns or worsening of condition.        Patient Education        Instrucciones para el monty hospitalaria después de la COVID-19   Acerca de annette fany   La enfermedad del coronavirus 2019 también se conoce olivia "COVID-19". Es tobin enfermedad viral que infecta los pulmones. Es causada por un virus llamado "coronavirus" asociado al SARS (SARS-CoV-2).  Los síntomas de la COVID-19 a menudo aparecen a los pocos días de haberse contagiado. En algunas personas los síntomas aparecen más tarde. Otras nunca presentan síntomas de la infección. Puede presentar tos, fiebre, escalofríos con temblores y puede tener dificultad para respirar. Puede sentirse muy cansado, tener dolor " muscular, dolor de shoshana o dolor de garganta. Algunas personas pueden tener malestar estomacal o diarrea. Otras pierden el sentido del gusto o del olfato. Quizás no tenga estos síntomas todo el tiempo y pueden aparecer y desaparecer mientras está enfermo.  El virus se propaga con facilidad mediante las gotitas que expulsa cuando habla, estornuda o tose. Puede transmitir el virus a otras personas cuando habla cerca de ellas, canta, se abraza, comparte tobin comida o se da la mano. Los gérmenes también sobreviven en superficies, olivia mesas, perillas de christiano y teléfonos. Sin embargo, esta no es tobin forma común de propagación de la COVID-19. Los médicos creen que las personas también propagan la infección aun cuando no tienen ningún síntoma, veronica no saben cómo sucede. Es por eso por lo que vacunarse es tobin de las mejores maneras de disminuir la propagación del virus.  Algunas personas tienen un melodie leve de COVID-19 y pueden permanecer en jess crowder y lejos de los demás hasta que se sientan mejor. Es posible que otras necesiten estar en el hospital si están muy enfermas. Algunas personas con COVID-19 pueden tener algunos síntomas vida semanas o meses. Las personas con COVID-19 deben aislarse. Pueden empezar a estar cerca de otras personas cuando el médico le indique que es seguro hacerlo.       ¿Qué cuidados se necesitan en casa?   · Pregúntele a tristan médico qué debe hacer cuando regrese a casa. Asegúrese de hacer preguntas si no comprende lo que dice el médico.  · Nikki mucha cantidad de agua, jugo o caldo para reemplazar los líquidos que pierde con la fiebre.  · Puede usar un humidificador de aire frío para aliviar la congestión y la tos.  · Use 2 o 3 almohadas para elevarse cuando se acueste para facilitar la respiración y el sueño.  · No fume y no nikki cerveza, vino ni bebidas mixtas (alcohol).  · Para reducir la posibilidad de contagio de la infección a otros, reciba la vacuna contra la COVID-19.  · Si no está  completamente vacunado:  ? Use tobin mascarilla que cubra la boca y la nariz si está cerca de otras personas que no están enfermas. Las mascarillas de alba funcionan mejor si tienen más de tobin capa de alba.  ? Lávese las navdeep con frecuencia.  ? Quédese en tristan casa en tobin habitación separada, de ser posible, alejado de los demás. Solo salga para recibir atención médica.  ? Use un baño separado, de ser posible.  ? No prepare alimentos para los demás.  ¿Qué cuidados se necesitan en la etapa de seguimiento?   · Es posible que los médicos le soliciten que visite el consultorio para evaluar tristan progreso. No falte a estas citas. Asegúrese de usar tobin mascarilla vida estas visitas.  · De ser posible, comunique con anticipación al personal que tiene COVID-19 para que se tomen las medidas necesarias para evitar el contagio de la enfermedad.  · Puede llevar algunas semanas que tristan mariana vuelva a la normalidad.  ¿Qué medicamentos pueden ser necesarios?   Es posible que el médico le recete medicamentos para lo siguiente:  · Ayudar con la respiración  · Ayudar con la fiebre  · Aliviar la hinchazón de las vías aéreas y los pulmones  · Controlar la tos  · Aliviar el dolor de garganta  · Aliviar el goteo nasal o la nariz tapada  ¿Estará restringida la actividad física?   Es posible que deba restringir la actividad física. Hable con el médico acerca de la cantidad adecuada de actividad que puede realizar. Si contreras estado muy enfermo con la COVID-19, es posible que le tome un tiempo recuperar la fuerza.  ¿Será necesario algún otro cuidado?   Los médicos no saben cuánto tiempo puede pasar el virus a otras personas después de enfermarse. Por eso es importante permanecer en tobin habitación separada, si es posible, cuando esté enfermo. Por ahora, los médicos le están dando pautas generales que debe seguir después de radha estado enfermo. Antes de estar cerca de otras personas, debe hacer lo siguiente:  · No tener fiebre vida 3 días sin  david ninguna medicación para bajarla  · No presentar síntomas de tos o falta de aire  · Esperar, al menos, 10 días después de radha presentado síntomas por primera vez o tener un examen positivo, y no tener síntomas olivia se indica más arriba. Algunos expertos sugieren esperar 14 rome. La cantidad de tiempo que debe evitar a otras personas puede basarse en la gravedad de toshia síntomas o si tiene problemas con tristan sistema inmunitario.  Hable con tristan médico sobre las vacunas contra la COVID-19.  ¿Qué problemas podrían surgir?   · Pérdida de líquidos. [place] se denomina deshidratación.  · Daño a corto plazo o a bud plazo en los pulmones  · Problemas del corazón  · Muerte  ¿Cuándo tacho llamar al médico?   · Le wilmer tanto respirar que solo puede decir tobin o dos palabras a la vez.  · Necesita sentarse en posición vertical en todo momento para poder respirar o no puede acostarse.  · Está muy confundido o no puede permanecer despierto.  · Toshia labios o piel comienzan a ponerse azules o grises.  · Shima que podría estar teniendo tobin emergencia médica. Algunos ejemplos de emergencias médicas son los siguientes:  ? Kemi de pecho graves.  ? No puedo hablar ni moverse con normalidad.  · Tiene dificultad para respirar al hablar o quedarse sentado.  · Sufre de falta de aire reciente.  · Se siente débil o mareado.  · Tiene tobin orina muy oscura o no orina vida más de 8 horas.  · Tiene síntomas de COVID-19 nuevos o que empeoran olivia los siguientes:  ? Fiebre  ? Tos  ? Sentir mucho cansancio  ? Escalofríos  ? Dolor de shoshana  ? Dificultad para tragar  ? Vómitos  ? Heces blandas  ? Manchas de color púrpura rojizo en los dedos de las navdeep o de los pies  Repita la enseñanza con toshia propias palabras (Teach Back): Ayudándolo a comprender   El método de enseñanza recíproca ayuda a comprender la información que se le está proporcionando. Después de hablar con el personal, cuente con toshia propias palabras lo que acaba de aprender. [place] le  asegura que el personal ha descrito todos los aspectos necesarios de forma erinn. También ayuda a explicar ciertas cosas que pueden radha sido confusas. Antes de irse a tristan casa, asegúrese de poder hacer lo siguiente:  · Hablar sobre mi afección.  · Decir qué me puede ayudar a respirar mejor.  · Decir cómo evitar contagiar a otras personas.  · Decir qué haré en melodie de tener dificultad para respirar; si me siento somnoliento o confundido; o si tengo las puntas y uñas de los dedos de las navdeep, la piel o los labios de color azulado.  Exención de responsabilidad y uso de la información del consumidor   Esta información no constituye asesoramiento médico específico y no reemplaza la información que usted recibe de tristan proveedor de atención médica. Es solamente un breve resumen de información general. NO incluye toda la información sobre afecciones, enfermedades, lesiones, pruebas, procedimientos, tratamientos, terapias, instrucciones para el monty hospitalaria u opciones de estilo de david que puedan ser pertinentes para usted. Debe hablar con el proveedor de atención médica para obtener la información completa sobre las opciones de tratamiento que posee y sobre tristan maraina. No se debe utilizar esta información para decidir si debe o no aceptar los consejos, instrucciones o recomendaciones del proveedor de atención médica. Solamente tristan proveedor de atención médica tiene el conocimiento y la capacitación para aconsejarle sobre lo que es adecuado para usted.     Patient Education        Instrucciones de monty hospitalaria después de tos, adultos   Acerca de annette fany   Muchos factores pueden causar tos. Un resfrío o las alergias pueden provocar tos. En otras ocasiones, el asma o el tabaquismo pueden provocar tos. Puede toser por la mucosidad que gotea por la parte posterior de la garganta o por el ácido del estómago que regresa a la garganta. En ocasiones, la tos es un efecto secundario de un medicamento. Es posible que esté  esperando los resultados de algunas pruebas. Si es así, el personal se comunicará con usted si hay resultados preocupantes.  ¿Qué cuidados se necesitan en casa?   · Pregúntele a tristan médico qué debe hacer cuando regrese a casa. Asegúrese de hacer preguntas si no comprende lo que dice el médico.  · Para ayudarlo a sentirse mejor:  ? Use un humidificador de aire frío para evitar respirar aire seco.  ? Para aliviar el dolor de garganta y la tos, chupe caramelos duros o para la tos.  ? Jorge gárgaras con agua salada (mezcle 1/2 cucharadita de sal con 1 taza de agua tibia) varias veces al día.  ? Rocíe agua salada en cada fosa nasal. Cualquier solución salina sirve.  ? Nikki líquidos tibios para mantener la garganta húmeda.  ? [place] duchas calientes y con vapor para calmar la tos.  · No fume ni esté en lugares llenos de humo. Evite cosas que podrían causarle problemas respiratorios, olivia vapear, los gases, la contaminación, el polvo y otros alérgenos comunes.  · Es posible que desee usar medicamentos de venta arun para las alergias o el reflujo ácido si tristan tos se debe a darvin de estos problemas.  · También puede usar un medicamento para la tos de venta arun.  ¿Qué cuidados se necesitan en la etapa de seguimiento?   Es posible que el médico le indique que vaya al consultorio para evaluar tristan progreso. No falte a estas citas.  ¿Qué medicamentos pueden ser necesarios?   Es posible que el médico le recete medicamentos para lo siguiente:  · Controlar la tos  · Reducir o eliminar la mucosidad  · Bloquear los alérgenos si la tos se debe a alergias  · Aumentar el flujo de aire si la tos se debe a asma o EPOC  · Reducir la inflamación en las vías respiratorias  · Reduzca el ácido estomacal si tristan tos se debe a problemas de ácido estomacal  ¿Estará restringida la actividad física?   La tos puede interferir con algunas de jess actividades.  ¿Qué cambios en la dieta son necesarios?   Algunas personas sienten que los productos lácteos  empeoran la producción de esputo y empeoran la tos. No existe ninguna prueba de que esto sea así. No hay necesidad de reducir el consumo de leche. Se ha demostrado que la miel es lopez efectiva olivia el principal medicamento de venta arun para calmar la tos. Use 1/2 a 1 cucharadita (de 2.5 a 5 ml) de miel, según sea necesario. [place] puede disminuir las secreciones y disminuir la tos. [place] le dé miel a un brandon dianna de 1 año.  ¿Cómo puede prevenirse annette problema de mariana?   · Lávese las navdeep a menudo con agua y jabón vida al menos 20 segundos, en particular luego de toser o estornudar. Los desinfectantes para navdeep a base de alcohol también funcionan para matar el virus.       · Si está enfermo, cúbrase la boca y la nariz con un pañuelo cuando tosa o estornude. También puede toser en dirección al codo. Deseche los pañuelos en la basura y lávese las navdeep después de tocar pañuelos usados.  · Limpie los artículos y las superficies con las que está más en contacto, olivia picaportes, controles remoto, teléfonos o juguetes. Límpielos con desinfectante. [place] puede ayudar a reducir el contagio de la infección.  · No se acerque demasiado (besar, abrazar) a personas que estén enfermas.  · No comparta toallas ni pañuelos con ninguna persona que esté enferma.  · Manténgase alejado de lugares concurridos.  · Vacúnese contra la gripe todos los años.     ¿Cuándo tacho llamar al médico?   · Tiene dolor de pecho cuando tose o problemas para respirar  · Empieza a toser jose o moco ara o amarillo.  · Tiene fiebre de 100.4 °F (38 °C) o más monty, o escalofríos.  · Tose tan agata que vomita.  · Sigue tosiendo después de 10 días.  Repita la enseñanza con jess propias palabras (Teach Back): Ayudándolo a comprender   El método de enseñanza recíproca ayuda a comprender la información que se le está proporcionando. Después de hablar con el personal, cuente con jess propias palabras lo que acaba de aprender. [place] le asegura que el personal  "ha descrito todos los aspectos necesarios de forma erinn. También ayuda a explicar ciertas cosas que pueden radha sido confusas. Antes de irse a tristan casa, asegúrese de poder hacer lo siguiente:  · Hablar sobre mi afección.  · Decir qué puedo hacer para reducir la mucosidad y aliviar la tos.  · Decir qué haré en melodie de tener sibilancia, opresión en el pecho, dificultad para respirar o si mis labios se tornan azules al toser.  ¿Dónde puedo obtener más información?   [place] [employer]  http://www.lung.ca/diseases-maladies/a-z/cough-toux/index_e.php   National Heart Lung and Blood [place]  http://www.nhlbi.nih.gov/health/health-topics/topics/cough/treatment.html   Exención de responsabilidad y uso de la información del consumidor   Esta información no constituye asesoramiento médico específico y no reemplaza la información que usted recibe de tristan proveedor de atención médica. [place] es tan solo un resumen de la información general. NO incluye la información completa acerca de afecciones, enfermedades, lesiones, pruebas, procedimientos, tratamientos, terapias, instrucciones para el monty o estilos de david que apliquen en tristan melodie. Debe hablar con el proveedor de atención médica para obtener información completa sobre tristan mariana y las opciones de tratamiento. No se debe utilizar esta información para decidir si acepta o no el consejo, instrucciones o recomendaciones del proveedor de atención médica. Solamente el proveedor de atención médica cuenta con los conocimientos y la capacitación para brindarle el mejor consejo.  Copyright   Copyright © 2021 [employer] y jess licenciantes y/o afiliados. Todos los derechos reservados.     Patient Education        Instrucciones de monty hospitalaria para dolor de garganta, adultos   Acerca de annette fany   La inflamación de la parte trasera de la garganta se llama "faringitis". La inflamación de la garganta y las amígdalas se llama "faringoamigdalitis". Y suelen recibir el nombre " "de "dolor de garganta". Es probable que tristan dolor de garganta sea causado por un virus. En la mayoría de los casos, annette dolor desaparece sin antibióticos en tobin o dos semanas.           ¿Qué cuidados se necesitan en casa?   · Pregúntele a tristan médico qué debe hacer cuando regrese a casa. Asegúrese de hacer preguntas si no comprende lo que dice el médico. Así sabrá qué debe hacer.  · Para ayudar a aliviar el dolor de garganta, puede hacer lo siguiente:  ? Use un aerosol para el dolor de garganta.  ? Chupe caramelos duros o pastillas para la garganta.  ? Jorge gárgaras con agua salada tibia algunas veces al día. Mezcla de 1/4 de cucharadita (1.25 gramos) de sal en 8 onzas (240 ml) de agua tibia.  ? Use un humidificador de vapor frío para ayudar a facilitar la respiración.  · Puede david medicamentos, olivia acetaminofén, ibuprofeno o naproxeno, para el dolor.  · Si decide david medicamentos para la tos o el resfriado de venta arun, siga cuidadosamente las instrucciones de la etiqueta. Asegúrese de no david más de un medicamento que contenga paracetamol. Además, si tiene problemas cardíacos o presión arterial monty, consulte con tristan médico antes de david cualquiera de estos medicamentos.  · Lávese las navdeep con frecuencia. [place] ayudará a mantener saludables a los demás.  ¿Qué cuidados se necesitan en la etapa de seguimiento?   Es posible que el médico le indique que vaya al consultorio para evaluar tristan progreso. No falte a estas citas.  ¿Qué medicamentos pueden ser necesarios?   [place] los medicamentos según se lo indique el médico. No omita dosis ni suspenda el tratamiento cuando se sienta mejor. Es posible que el médico le recete medicamentos para lo siguiente:  · Prevenir o combatir tobin infección  · Aliviar el dolor  · Bajar la fiebre  · Ayudar con la congestión nasal y goteo nasal  · Angle la garganta  ¿Estará restringida la actividad física?   Quizás necesite descansar en tristan casa 1 a 2 días o hasta que se sienta " luc.  ¿Qué cambios en la dieta son necesarios?   Si tiene demasiado dolor de garganta olivia para comer alimentos sólidos, puede beber jugo, leche, malteadas o sopas. Hable con el médico acerca de qué dieta es adecuada para la afección.  ¿Cómo puede prevenirse annette problema de mariana?   · Lávese las navdeep con frecuencia. Asegúrese de lavarse las navdeep después de sonarse la nariz o cuidar a otras personas que tienen dolor de garganta.  · No comparta utensilios ni vasos con alguien que tiene dolor de garganta. Lave los objetos con agua tibia y detergente.  · No comparta alimentos o bebidas con otras personas que estén enfermas. Puede infectarlas.  · Deseche los pañuelos desechables utilizados de inmediato y luego lávese las navdeep.  · Compre un nuevo cepillo de dientes después de que desaparezcan los signos o cuando termine de david los antibióticos.  ¿Cuándo tacho llamar al médico?   · Tiene dificultad para respirar.  · Tristan princess, lengua o garganta están hinchados.  · Está babeando porque no puede tragar la saliva.  · Tiene un dolor muy agata en la garganta que le impide comer o beber nada.  · Tiene bultos grandes y dolorosos en el princess.  · Tiene ampollas en la parte posterior de la garganta.  Repita la enseñanza con jess propias palabras (Teach Back): Ayudándolo a comprender   El método de enseñanza recíproca ayuda a comprender la información que se le está proporcionando. Después de hablar con el personal, cuente con jess propias palabras lo que acaba de aprender. [place] le asegura que el personal ha descrito todos los aspectos necesarios de forma erinn. También ayuda a explicar ciertas cosas que pueden radha sido confusas. Antes de irse a tristan casa, asegúrese de poder hacer lo siguiente:  · Hablar sobre mi afección.  · Decir qué ayuda a aliviar el dolor.  · Decir qué haré en melodie de tener dificultad para respirar o tragar, o si tengo bultos grandes y dolorosos en la garganta.  ¿Dónde puedo obtener más información?    Centers for Disease Control and Prevention  http://www.cdc.gov/getsmart/antibiotic-use/URI/sore-throat.html   Exención de responsabilidad y uso de la información del consumidor   Esta información no constituye asesoramiento médico específico y no reemplaza la información que usted recibe de tristan proveedor de atención médica. Danielle es tan solo un resumen de la información general. NO incluye la información completa acerca de afecciones, enfermedades, lesiones, pruebas, procedimientos, tratamientos, terapias, instrucciones para el monty o estilos de david que apliquen en tristan melodie. Debe hablar con el proveedor de atención médica para obtener información completa sobre tristan mariana y las opciones de tratamiento. No se debe utilizar esta información para decidir si acepta o no el consejo, instrucciones o recomendaciones del proveedor de atención médica. Solamente el proveedor de atención médica cuenta con los conocimientos y la capacitación para brindarle el mejor consejo.  Copyright   Copyright © 2021 [employer] y jess licenciantes y/o afiliados. Todos los derechos reservados.

## 2022-08-14 NOTE — PATIENT INSTRUCTIONS
You must understand that you've received an Urgent Care treatment only and that you may be released before all your medical problems are known or treated. You, the patient, will arrange for follow up care as instructed.    Follow up with your PCP or specialty clinic as directed in the next 1-2 weeks if not improved or as needed. You can call [phone] to schedule an appointment with the appropriate provider.    If your condition worsens we recommend that you receive another evaluation at the emergency room immediately or contact your primary medical clinic's after hours call service to discuss your concerns.    Please go to the Emergency Department for any concerns or worsening of condition.

## 2022-12-22 ENCOUNTER — HOSPITAL ENCOUNTER (OUTPATIENT)
Dept: RADIOLOGY | Facility: HOSPITAL | Age: 52
Discharge: HOME | End: 2022-12-22
Attending: STUDENT IN AN ORGANIZED HEALTH CARE EDUCATION/TRAINING PROGRAM
Payer: MEDICAID

## 2022-12-22 ENCOUNTER — OFFICE VISIT (OUTPATIENT)
Dept: FAMILY MEDICINE | Facility: HOSPITAL | Age: 52
End: 2022-12-22
Payer: MEDICAID

## 2022-12-22 VITALS
SYSTOLIC BLOOD PRESSURE: 135 MMHG | HEIGHT: 59 IN | HEART RATE: 76 BPM | WEIGHT: 207.44 LBS | DIASTOLIC BLOOD PRESSURE: 84 MMHG | BODY MASS INDEX: 41.82 KG/M2

## 2022-12-22 DIAGNOSIS — Z11.59 ENCOUNTER FOR HEPATITIS C SCREENING TEST FOR LOW RISK PATIENT: ICD-10-CM

## 2022-12-22 DIAGNOSIS — Z12.11 ENCOUNTER FOR SCREENING FOR MALIGNANT NEOPLASM OF COLON: ICD-10-CM

## 2022-12-22 DIAGNOSIS — M19.90 ARTHRITIS: ICD-10-CM

## 2022-12-22 DIAGNOSIS — M25.531 RIGHT WRIST PAIN: ICD-10-CM

## 2022-12-22 DIAGNOSIS — Z12.2 ENCOUNTER FOR SCREENING FOR LUNG CANCER: ICD-10-CM

## 2022-12-22 DIAGNOSIS — Z11.4 ENCOUNTER FOR SCREENING FOR HIV: Primary | ICD-10-CM

## 2022-12-22 PROCEDURE — 73562 X-RAY EXAM OF KNEE 3: CPT | Mod: TC,50,FY

## 2022-12-22 PROCEDURE — 73562 X-RAY EXAM OF KNEE 3: CPT | Mod: 26,50,, | Performed by: INTERNAL MEDICINE

## 2022-12-22 PROCEDURE — 73110 X-RAY EXAM OF WRIST: CPT | Mod: 26,50,, | Performed by: INTERNAL MEDICINE

## 2022-12-22 PROCEDURE — 73110 X-RAY EXAM OF WRIST: CPT | Mod: TC,50,FY

## 2022-12-22 PROCEDURE — 99214 OFFICE O/P EST MOD 30 MIN: CPT | Performed by: STUDENT IN AN ORGANIZED HEALTH CARE EDUCATION/TRAINING PROGRAM

## 2022-12-22 PROCEDURE — 73562 XR KNEE ORTHO BILAT: ICD-10-PCS | Mod: 26,50,, | Performed by: INTERNAL MEDICINE

## 2022-12-22 PROCEDURE — 73110 XR WRIST COMPLETE 3 VIEWS BILATERAL: ICD-10-PCS | Mod: 26,50,, | Performed by: INTERNAL MEDICINE

## 2022-12-22 RX ORDER — PROMETHAZINE HYDROCHLORIDE AND DEXTROMETHORPHAN HYDROBROMIDE 6.25; 15 MG/5ML; MG/5ML
SYRUP ORAL
COMMUNITY
Start: 2022-08-19 | End: 2023-01-19

## 2022-12-22 RX ORDER — BENZONATATE 100 MG/1
CAPSULE ORAL
COMMUNITY
End: 2022-12-22 | Stop reason: SDUPTHER

## 2022-12-22 RX ORDER — BENZONATATE 100 MG/1
CAPSULE ORAL
COMMUNITY
Start: 2022-08-19 | End: 2023-01-19

## 2022-12-22 RX ORDER — MELOXICAM 15 MG/1
15 TABLET ORAL DAILY
Qty: 60 TABLET | Refills: 0 | Status: SHIPPED | OUTPATIENT
Start: 2022-12-22 | End: 2023-01-12 | Stop reason: ALTCHOICE

## 2022-12-22 NOTE — PROGRESS NOTES
History & Physical  [facility]      SUBJECTIVE:     History of Present Illness:  Patient is a 52 y.o. female presents to clinic to establish care.  Patient's chief complaint is chronic knee, elbow, and shoulder pain.  Has been told by past providers that she has arthritis.  Has not done physical therapy, does endorse some relief with OTC ibuprofen.  States that pain is worse in the morning and improves other day but worsens at night.  Pain is mostly in her distal joints and very little back pain noted.  Has not been worked up for rheumatoid arthritis to her knowledge.  Patient is not interested in physical therapy at this time.  She also endorses a small nodular wrist mass on her right wrist that appeared to years ago.  One year ago it started to become painful to palpation.  Denies any pain with any wrist range of motion.  When palpated she does endorse some numbness or tingling to her fingertips.        - Colonoscopy: DUE - ORDERED  cologuard  (Grade A: adults 45-75; colonoscopy q10y, fecal immunochemical testing (FIT) q1y, FIT-fecal DNA testing (Cologuard) q3y)  - DM: Last A1c: DUE  (Grade B: adults 40-70 with BMI ?25; if normal, repeat q3y)  - MMG: COMPLETED - 2020 wnl  (Grade B: q1-2y for women 40-75; >75 after discussion on harms and benefits with patient)  - PAP: COMPLETED - wnl  (Grade A: q3y with cervical cytology alone in women 21-29 years. For women 30-65 years, q3y with cervical cytology alone, q5y with high-risk HPV (hrHPV) testing alone, or q5y with hrHPV testing in combination with cytology)  - Statin: NO  (Grade B: adults 40-75 years with no history of CVD, ?1 CVD risk factors (dyslipidemia, DM, HTN, or smoking), and ASCVD ?10%)  Last lipid panel-  today  - HCV: DUE - ORDERED  (Grade B: screen all patients age 18-79)  - HIV: DUE - ORDERED  (Grade A: ages 15-65 years; ?66 if high-risk)  Last STI testing- N/A  - DXA: N/A  (Grade B: women ?65 years or post-menopausal women with risk-factors)  -  "LDCT: DUE - ORDERED  (Grade B: q1yr for adults 50-80 years with 20 PY and currently smoke or have quit ?15 years)  - US abdomen: N/A   (Grade B: one-time screening for AAA in men 65-75 years who have ever smoked)  - Depression: NO  (All adults)  - Fall risk: NO  Get Up and Go Test (positive >30 seconds, or negative <20; get up, walk 10 feet, turn around and sit; adults ?65 years).  - Tobacco abuse: CURRENT SMOKER - pack years 20  (Grade A: all adults)      Review of patient's allergies indicates:   Allergen Reactions    Topiramate        Past Medical History:   Diagnosis Date    Disorder of kidney and ureter     Hypertension     Migraine headache      Past Surgical History:   Procedure Laterality Date     SECTION      REDUCTION OF BOTH BREASTS Bilateral     TOTAL REDUCTION MAMMOPLASTY Bilateral     TUBAL LIGATION       Family History   Family history unknown: Yes     Social History     Tobacco Use    Smoking status: Every Day     Packs/day: 0.35     Types: Cigarettes    Smokeless tobacco: Never   Substance Use Topics    Alcohol use: No    Drug use: No        OBJECTIVE:     Vital Signs (Most Recent)  Vitals:    22 0832   BP: 135/84   Pulse: 76   Weight: 94.1 kg (207 lb 7.3 oz)   Height: 4' 11" (1.499 m)     Body mass index is 41.9 kg/m².     Review of Systems   Constitutional:  Negative for chills, fever and malaise/fatigue.   HENT:  Negative for congestion, ear pain, hearing loss and sore throat.    Eyes:  Negative for blurred vision, pain and redness.   Respiratory:  Negative for cough and shortness of breath.    Cardiovascular:  Negative for chest pain, palpitations and leg swelling.   Gastrointestinal:  Negative for abdominal pain, constipation, diarrhea, heartburn, nausea and vomiting.   Genitourinary:  Negative for dysuria, frequency and urgency.   Musculoskeletal:  Positive for back pain, joint pain and myalgias.   Skin:  Negative for rash.   Neurological:  Negative for focal weakness, weakness " and headaches.   Psychiatric/Behavioral:  Negative for depression and substance abuse.       Physical Exam  Vitals reviewed.   Constitutional:       Appearance: Normal appearance.   HENT:      Head: Normocephalic and atraumatic.      Mouth/Throat:      Mouth: Mucous membranes are moist.      Pharynx: Oropharynx is clear.   Eyes:      Extraocular Movements: Extraocular movements intact.      Conjunctiva/sclera: Conjunctivae normal.   Cardiovascular:      Rate and Rhythm: Normal rate and regular rhythm.      Pulses: Normal pulses.      Heart sounds: Normal heart sounds.   Pulmonary:      Effort: Pulmonary effort is normal.      Breath sounds: Normal breath sounds.   Abdominal:      General: Abdomen is flat.      Palpations: Abdomen is soft.   Musculoskeletal:         General: Normal range of motion.      Cervical back: Normal range of motion.      Comments: Full ROM throughout    Wrist: no pain with ROM, small 1cm nodule distal to the R ulnar tuberosity. Sharply Painful to palpation with radiation to fingers   Skin:     General: Skin is warm.   Neurological:      General: No focal deficit present.      Mental Status: She is alert.   Psychiatric:         Mood and Affect: Mood normal.            ASSESSMENT/PLAN:   52 y.o.female presents to clinic to establish care. With arthritis and wrist nodule that appears consistent with a ganglion cyst. Will obtain baseline labs, xray and labs to assess for RA, will also update pt health care screening. Also will prescribe mobic for joint pain. If pain does not improve will consider steroid injection of cyst or surgical referral    Encounter for screening for HIV  -     HIV 1/2 Ag/Ab (4th Gen); Future; Expected date: 12/22/2022    Encounter for hepatitis C screening test for low risk patient  -     Hepatitis C Antibody; Future; Expected date: 12/22/2022    Encounter for screening for malignant neoplasm of colon  -     Cologuard Screening (Multitarget Stool DNA); Future; Expected  date: 12/22/2022    Encounter for screening for lung cancer  -     CT Chest Lung Screening Low Dose; Future; Expected date: 12/22/2022    BMI 40.0-44.9, adult  -     Hemoglobin A1C; Future; Expected date: 12/22/2022  -     Lipid Panel; Future; Expected date: 12/22/2022  -     Comprehensive Metabolic Panel; Future; Expected date: 12/22/2022  -     CBC Auto Differential; Future; Expected date: 12/22/2022    Arthritis  -     Rheumatoid Factor; Future; Expected date: 12/22/2022  -     CALEB Screen w/Reflex; Future; Expected date: 12/22/2022  -     X-ray Knee Ortho Bilateral; Future; Expected date: 12/22/2022  -     CYCLIC CITRUL PEPTIDE ANTIBODY, IGG; Future; Expected date: 12/22/2022  -     X-Ray Wrist Complete Bilateral; Future; Expected date: 12/22/2022  -     meloxicam (MOBIC) 15 MG tablet; Take 1 tablet (15 mg total) by mouth once daily.  Dispense: 60 tablet; Refill: 0    Right wrist pain  -     X-Ray Wrist Complete Bilateral; Future; Expected date: 12/22/2022        Follow-up: 1 month    Farhat Justin MD, MPH  [facility], PGY-2    This note was partially created using [employer] Voice Recognition software. Typographical and content errors may occur with this process. While efforts are made to detect and correct such errors, in some cases errors will persist. For this reason, wording in this document should be considered in the proper context and not strictly verbatim.

## 2023-01-12 ENCOUNTER — HOSPITAL ENCOUNTER (EMERGENCY)
Facility: HOSPITAL | Age: 53
Discharge: HOME | End: 2023-01-12
Attending: STUDENT IN AN ORGANIZED HEALTH CARE EDUCATION/TRAINING PROGRAM
Payer: MEDICAID

## 2023-01-12 VITALS
DIASTOLIC BLOOD PRESSURE: 77 MMHG | WEIGHT: 204 LBS | HEART RATE: 88 BPM | SYSTOLIC BLOOD PRESSURE: 133 MMHG | TEMPERATURE: 99 F | BODY MASS INDEX: 41.2 KG/M2 | OXYGEN SATURATION: 98 % | RESPIRATION RATE: 18 BRPM

## 2023-01-12 DIAGNOSIS — M54.41 ACUTE RIGHT-SIDED LOW BACK PAIN WITH RIGHT-SIDED SCIATICA: Primary | ICD-10-CM

## 2023-01-12 LAB
BILIRUB UR QL STRIP: NEGATIVE
CLARITY UR: CLEAR
COLOR UR: YELLOW
GLUCOSE UR QL STRIP: NEGATIVE
HGB UR QL STRIP: NEGATIVE
KETONES UR QL STRIP: NEGATIVE
LEUKOCYTE ESTERASE UR QL STRIP: NEGATIVE
NITRITE UR QL STRIP: NEGATIVE
PH UR STRIP: 6 [PH] (ref 5–8)
PROT UR QL STRIP: NEGATIVE
SP GR UR STRIP: 1.02 (ref 1–1.03)
URN SPEC COLLECT METH UR: NORMAL
UROBILINOGEN UR STRIP-ACNC: NEGATIVE EU/DL

## 2023-01-12 PROCEDURE — 63600175 PHARM REV CODE 636 W HCPCS: Performed by: PHYSICIAN ASSISTANT

## 2023-01-12 PROCEDURE — 99284 EMERGENCY DEPT VISIT MOD MDM: CPT

## 2023-01-12 PROCEDURE — 96372 THER/PROPH/DIAG INJ SC/IM: CPT | Performed by: PHYSICIAN ASSISTANT

## 2023-01-12 PROCEDURE — 81003 URINALYSIS AUTO W/O SCOPE: CPT | Performed by: PHYSICIAN ASSISTANT

## 2023-01-12 PROCEDURE — 25000003 PHARM REV CODE 250: Performed by: PHYSICIAN ASSISTANT

## 2023-01-12 RX ORDER — KETOROLAC TROMETHAMINE 30 MG/ML
15 INJECTION, SOLUTION INTRAMUSCULAR; INTRAVENOUS
Status: COMPLETED | OUTPATIENT
Start: 2023-01-12 | End: 2023-01-12

## 2023-01-12 RX ORDER — LIDOCAINE 50 MG/G
1 PATCH TOPICAL DAILY
Qty: 5 PATCH | Refills: 0 | Status: SHIPPED | OUTPATIENT
Start: 2023-01-12 | End: 2023-01-19

## 2023-01-12 RX ORDER — LIDOCAINE 50 MG/G
1 PATCH TOPICAL ONCE
Status: DISCONTINUED | OUTPATIENT
Start: 2023-01-12 | End: 2023-01-12 | Stop reason: HOSPADM

## 2023-01-12 RX ORDER — NAPROXEN 500 MG/1
500 TABLET ORAL 2 TIMES DAILY WITH MEALS
Qty: 30 TABLET | Refills: 0 | Status: SHIPPED | OUTPATIENT
Start: 2023-01-12 | End: 2023-01-25

## 2023-01-12 RX ORDER — METHOCARBAMOL 500 MG/1
500 TABLET, FILM COATED ORAL 3 TIMES DAILY
Qty: 15 TABLET | Refills: 0 | Status: SHIPPED | OUTPATIENT
Start: 2023-01-12 | End: 2023-01-17

## 2023-01-12 RX ADMIN — LIDOCAINE 1 PATCH: 50 PATCH CUTANEOUS at 12:01

## 2023-01-12 RX ADMIN — KETOROLAC TROMETHAMINE 15 MG: 30 INJECTION, SOLUTION INTRAMUSCULAR; INTRAVENOUS at 12:01

## 2023-01-12 NOTE — Clinical Note
"Clayton"Jose A Sandoval was seen and treated in our emergency department on 1/12/2023.  She may return to work on 01/14/2023.       If you have any questions or concerns, please don't hesitate to call.      Mick Castillo PA-C"

## 2023-01-12 NOTE — DISCHARGE INSTRUCTIONS

## 2023-01-12 NOTE — ED NOTES
Patient started with right lower back hip pain that started while at work no hx of trauma or falls. No urinary tract complications, hx of diabetes. No distress noted     APPEARANCE: Alert, oriented and in no acute distress.  HEENT: Speaks without hoarseness.  CARDIAC: Normal rate and rhythm.    PERIPHERAL VASCULAR: peripheral pulses present. Normal cap refill. No edema. Warm to touch.    RESPIRATORY:Normal rate and effort. Respirations are equal and unlabored no obvious signs of distress.  GASTRO: soft, nondistended, nontender. Denies nausea, vomiting, or diarrhea.  : voids spontaneously and without difficulty.   MUSC: Limited ROM on right hip and lower back. Lower pain noted with tenderness to touch on right lumbar  No obvious deformity. Ambulatory with a steady gait  SKIN: Skin is warm and dry, without discoloration. Mucous membranes moist.  NEURO: Pt is awake, alert, aware of environment. No neurologic deficits noted.

## 2023-01-12 NOTE — ED PROVIDER NOTES
Encounter Date: 2023       History     Chief Complaint   Patient presents with    Back Pain     Lower back pain that started this morning and radiates down the back of her right leg.  Patient denies injury.  Pain started today.  History of sciatica.     52-year-old female presents to ED with concern of right-sided lower back pain that began this morning, denying any specific injury or trauma.  She does report history of sciatica, reporting symptoms feel similar to previous episodes.  Pain is sharp, worse with movement or activity, improved with rest, radiating into right lower extremity, severity 10/10.  No numbness, focal weakness, urinary or bowel incontinence.  Denies dysuria, hematuria, changes in urine color or frequency, flank pain, abdominal pain, fevers, chills, nausea or vomiting.  No other acute complaints at this time.    The history is provided by the patient. The history is limited by a language barrier. A  was used (Folloyu:  [id number]).   Review of patient's allergies indicates:   Allergen Reactions    Topiramate      Past Medical History:   Diagnosis Date    Disorder of kidney and ureter     Hypertension     Migraine headache      Past Surgical History:   Procedure Laterality Date     SECTION      REDUCTION OF BOTH BREASTS Bilateral     TOTAL REDUCTION MAMMOPLASTY Bilateral     TUBAL LIGATION       Family History   Family history unknown: Yes     Social History     Tobacco Use    Smoking status: Every Day     Packs/day: 0.35     Types: Cigarettes    Smokeless tobacco: Never   Substance Use Topics    Alcohol use: No    Drug use: No     Review of Systems   Constitutional:  Negative for chills and fever.   Gastrointestinal:  Negative for abdominal pain, nausea and vomiting.   Genitourinary:  Negative for dysuria, flank pain and hematuria.   Musculoskeletal:  Positive for back pain. Negative for neck pain and neck stiffness.   Skin:  Negative for rash and wound.    Neurological:  Negative for weakness and numbness.     Physical Exam     Initial Vitals [01/12/23 1146]   BP Pulse Resp Temp SpO2   133/77 88 18 98.9 °F (37.2 °C) 98 %      MAP       --         Physical Exam    Nursing note and vitals reviewed.  Constitutional: She appears well-developed and well-nourished. She is active. She does not have a sickly appearance. She does not appear ill. No distress.   HENT:   Head: Normocephalic and atraumatic.   Neck:   Normal range of motion.  Pulmonary/Chest: Effort normal.   Musculoskeletal:      Cervical back: Normal range of motion.      Comments: Reproducible tenderness to right-sided lower lumbar paraspinal muscle.  Denying left-sided or midline tenderness.  Tenderness does extend to right gluteal region.  No tenderness or swelling into right lower extremity. + SLR on right.  Equal sensation and strength into BLE.     Neurological: She is alert. GCS eye subscore is 4. GCS verbal subscore is 5. GCS motor subscore is 6.   Skin: Skin is warm and dry.   Psychiatric: She has a normal mood and affect. Her speech is normal and behavior is normal.       ED Course   Procedures  Labs Reviewed   URINALYSIS, REFLEX TO URINE CULTURE    Narrative:     Specimen Source->Urine          Imaging Results    None          Medications   LIDOcaine 5 % patch 1 patch (1 patch Transdermal Patch Applied 1/12/23 1246)   ketorolac injection 15 mg (15 mg Intramuscular Given 1/12/23 1243)     Medical Decision Making:   Initial Assessment:   History obtained by patient, assisted by language line .  Patient presents with concern of right-sided lower back pain that began this morning, denying any specific injury or trauma.  She reports symptoms feel consistent with previous episodes of sciatica.  Afebrile with vitals WNL.  Reproducible tenderness to right-sided lower lumbar paraspinal muscle.  Denying midline bony tenderness.  Differential Diagnosis:   Strain, sprain, spasm, radiculopathy,  neuropathy, sciatica, DDD, arthritis, UTI  ED Management:  UA unremarkable with no evidence of urinary infection or hematuria.  Will plan to continue with conservative care.  I do not suspect acute infectious process or diskitis.  No evidence to suggest cauda equina.    Patient was given Toradol injection Lidoderm patch in ED along with prescriptions as written below.  Patient is reporting improvement of her symptoms prior to discharge.  Encouraged ice/heat, stretches and movements as tolerated, close PCP follow-up.  ED return precautions were discussed at length.  Patient states her understanding and agrees with plan.                        Clinical Impression:   Final diagnoses:  [M54.41] Acute right-sided low back pain with right-sided sciatica (Primary)        ED Disposition Condition    Discharge Stable          ED Prescriptions       Medication Sig Dispense Start Date End Date Auth. Provider    methocarbamoL (ROBAXIN) 500 MG Tab Take 1 tablet (500 mg total) by mouth 3 (three) times daily. for 5 days 15 tablet 1/12/2023 1/17/2023 Mick Castillo PA-C    naproxen (NAPROSYN) 500 MG tablet Take 1 tablet (500 mg total) by mouth 2 (two) times daily with meals. 30 tablet 1/12/2023 -- Mick Castillo PA-C    LIDOcaine (LIDODERM) 5 % Place 1 patch onto the skin once daily. Remove & Discard patch within 12 hours or as directed by MD 5 patch 1/12/2023 -- Mick Castillo PA-C          Follow-up Information       Follow up With Specialties Details Why Contact Info    Farhat Justin MD Family Medicine   [address], [address]  [facility] [address] [phone]               Mick Castillo PA-C  01/12/23 2878

## 2023-01-19 ENCOUNTER — OFFICE VISIT (OUTPATIENT)
Dept: FAMILY MEDICINE | Facility: HOSPITAL | Age: 53
End: 2023-01-19
Payer: MEDICAID

## 2023-01-19 VITALS
HEIGHT: 59 IN | SYSTOLIC BLOOD PRESSURE: 112 MMHG | DIASTOLIC BLOOD PRESSURE: 76 MMHG | HEART RATE: 93 BPM | WEIGHT: 206.38 LBS | BODY MASS INDEX: 41.6 KG/M2

## 2023-01-19 DIAGNOSIS — I10 PRIMARY HYPERTENSION: Primary | ICD-10-CM

## 2023-01-19 DIAGNOSIS — M67.431 GANGLION CYST OF DORSUM OF RIGHT WRIST: ICD-10-CM

## 2023-01-19 PROCEDURE — 99214 OFFICE O/P EST MOD 30 MIN: CPT | Performed by: STUDENT IN AN ORGANIZED HEALTH CARE EDUCATION/TRAINING PROGRAM

## 2023-01-19 RX ORDER — AMLODIPINE BESYLATE 5 MG/1
5 TABLET ORAL DAILY
Qty: 90 TABLET | Refills: 3 | Status: SHIPPED | OUTPATIENT
Start: 2023-01-19 | End: 2024-01-14

## 2023-01-19 RX ORDER — PROPRANOLOL HYDROCHLORIDE 120 MG/1
120 CAPSULE, EXTENDED RELEASE ORAL DAILY
Qty: 90 CAPSULE | Refills: 3 | Status: SHIPPED | OUTPATIENT
Start: 2023-01-19 | End: 2024-01-14

## 2023-01-19 NOTE — PROGRESS NOTES
I assume primary medical responsibility for this patient. I have reviewed the history, physical, and assessement & treatment plan with the resident and agree that the care is reasonable and necessary. This service has been performed by a resident without the presence of a teaching physician under the primary care exception. If necessary, an addendum of additional findings or evaluation beyond the resident documentation will be noted below.     Siria Dumont MD

## 2023-01-25 NOTE — PROGRESS NOTES
PROGRESS NOTE  [facility]    Subjective:       Patient ID: Clayton Sandoval is a 52 y.o. female.    Chief Complaint: Follow-up      52-year-old female here for follow-up on knee pain secondary to arthritis and painful right wrist ganglion cyst.  Recently trialed conservative pain regimen for her arthritis which patient was refractory to.  Is mildly responsive to Mobic and topical Voltaren gel.  Refuses to take ibuprofen or Naprosyn because she believes she is intolerant to it and it makes her blood pressure increased.  Is also not interested in muscle relaxers.  Patient also refuses physical therapy.  Informed patient that we could offer injections of her knees for temporary relief which patient was amenable to.    Review of Systems   Constitutional:  Negative for unexpected weight change.   HENT:  Negative for congestion, rhinorrhea, sneezing and sore throat.    Eyes:  Negative for redness.   Respiratory:  Negative for cough and shortness of breath.    Cardiovascular:  Negative for chest pain.   Gastrointestinal:  Negative for abdominal pain, constipation and diarrhea.   Genitourinary:  Negative for pelvic pain.   Musculoskeletal:  Positive for arthralgias. Negative for joint swelling.   Skin:  Negative for color change and pallor.     Objective:      Vitals:    01/19/23 1413   BP: 112/76   Pulse: 93     Physical Exam  Vitals and nursing note reviewed.   Constitutional:       Appearance: Normal appearance.   HENT:      Head: Normocephalic and atraumatic.      Right Ear: Tympanic membrane normal.      Left Ear: Tympanic membrane normal.      Mouth/Throat:      Mouth: Mucous membranes are moist.      Pharynx: Oropharynx is clear.   Eyes:      Extraocular Movements: Extraocular movements intact.      Pupils: Pupils are equal, round, and reactive to light.   Cardiovascular:      Rate and Rhythm: Normal rate and regular rhythm.      Pulses: Normal pulses.      Heart sounds: Normal heart sounds.   Pulmonary:       Effort: Pulmonary effort is normal.      Breath sounds: Normal breath sounds.   Abdominal:      General: Abdomen is flat.      Palpations: Abdomen is soft.   Musculoskeletal:         General: Normal range of motion.      Cervical back: Normal range of motion.      Comments: Small nonfluctuant ganglion cyst the dorsum of the right wrist not painful with range of motion however pain pole to palpation.      Decreased range of motion to bilateral knees secondary to pain.   Skin:     General: Skin is warm.   Neurological:      General: No focal deficit present.      Mental Status: She is alert and oriented to person, place, and time.   Psychiatric:         Mood and Affect: Mood normal.       Assessment:       1. Primary hypertension    2. Ganglion cyst of dorsum of right wrist        52-year-old female with hypertension, joint pain secondary to arthritis, ganglion cyst of the right wrist refractory to conservative management  Plan:       Will refill patient's antihypertensives, and refer patient to hand surgery for ganglion cysts, plan for steroid injection knees on 02/17.  Primary hypertension  -     amLODIPine (NORVASC) 5 MG tablet; Take 1 tablet (5 mg total) by mouth once daily.  Dispense: 90 tablet; Refill: 3  -     propranoloL (INDERAL LA) 120 MG 24 hr capsule; Take 1 capsule (120 mg total) by mouth once daily.  Dispense: 90 capsule; Refill: 3    Ganglion cyst of dorsum of right wrist  -     Ambulatory referral/consult to Hand Surgery; Future; Expected date: 01/26/2023        Follow up in: on 2/17 for steroid injection        Farhat Justin MD, MPH  [facility], PGY-2    This note was partially created using [employer] Voice Recognition software. Typographical and content errors may occur with this process. While efforts are made to detect and correct such errors, in some cases errors will persist. For this reason, wording in this document should be considered in the proper context and not strictly verbatim.

## 2023-01-30 NOTE — PROGRESS NOTES
I assume primary medical responsibility for this patient. I have reviewed the history, physical, and assessement & treatment plan with the resident and agree that the care is reasonable and necessary. This service has been performed by a resident without the presence of a teaching physician under the primary care exception. If necessary, an addendum of additional findings or evaluation beyond the resident documentation will be noted below.      Macrina Bean MD    [facility]

## 2023-05-08 ENCOUNTER — TELEPHONE (OUTPATIENT)
Dept: FAMILY MEDICINE | Facility: HOSPITAL | Age: 53
End: 2023-05-08
Payer: MEDICAID

## 2023-05-08 LAB — NONINV COLON CA DNA+OCC BLD SCRN STL QL: NEGATIVE

## 2023-05-08 NOTE — TELEPHONE ENCOUNTER
----- Message from Colt Horowitz sent at 5/8/2023  9:19 AM CDT -----  Contact: pt  Type: Requesting to speak with nurse        Who Called: PT  Regarding: discuss stool results   Would the patient rather a call back or a response via [facility]? Call back  Best Call Back Number: [phone]  Additional Information:  needs

## 2023-05-18 ENCOUNTER — HOSPITAL ENCOUNTER (EMERGENCY)
Facility: HOSPITAL | Age: 53
Discharge: HOME | End: 2023-05-18
Attending: EMERGENCY MEDICINE
Payer: MEDICAID

## 2023-05-18 VITALS
RESPIRATION RATE: 17 BRPM | BODY MASS INDEX: 39.27 KG/M2 | HEART RATE: 87 BPM | WEIGHT: 200 LBS | SYSTOLIC BLOOD PRESSURE: 133 MMHG | DIASTOLIC BLOOD PRESSURE: 91 MMHG | TEMPERATURE: 98 F | OXYGEN SATURATION: 96 % | HEIGHT: 60 IN

## 2023-05-18 DIAGNOSIS — M25.539 WRIST PAIN: Primary | ICD-10-CM

## 2023-05-18 PROCEDURE — 99284 EMERGENCY DEPT VISIT MOD MDM: CPT

## 2023-05-18 RX ORDER — NAPROXEN 500 MG/1
500 TABLET ORAL 2 TIMES DAILY WITH MEALS
Qty: 10 TABLET | Refills: 0 | Status: SHIPPED | OUTPATIENT
Start: 2023-05-18

## 2023-05-18 RX ORDER — ORPHENADRINE CITRATE 100 MG/1
100 TABLET, EXTENDED RELEASE ORAL 2 TIMES DAILY
Qty: 10 TABLET | Refills: 0 | Status: SHIPPED | OUTPATIENT
Start: 2023-05-18

## 2023-05-19 NOTE — ED PROVIDER NOTES
Encounter Date: 2023       History     Chief Complaint   Patient presents with    Wrist Pain     Pain and swelling to right wrist x 3 days, denies any trauma . Took Tylenol and Advil with no relief.      was used to obtain all information.  The patient presents with pain and edema to her right wrist for the past 3 days.  She has had issues with this wrist before and has been told that she is had osteoarthritis in the past.  She works at a [employer].  She states that movement and palpation exacerbate the pain.  She denies any numbness or paresthesias.  She denies any trauma.    Review of patient's allergies indicates:   Allergen Reactions    Topiramate      Past Medical History:   Diagnosis Date    Disorder of kidney and ureter     Hypertension     Migraine headache      Past Surgical History:   Procedure Laterality Date     SECTION      REDUCTION OF BOTH BREASTS Bilateral     TOTAL REDUCTION MAMMOPLASTY Bilateral     TUBAL LIGATION       Family History   Family history unknown: Yes     Social History     Tobacco Use    Smoking status: Every Day     Packs/day: 0.35     Types: Cigarettes    Smokeless tobacco: Never   Substance Use Topics    Alcohol use: No    Drug use: No     Review of Systems   Musculoskeletal:         Right wrist pain     Physical Exam     Initial Vitals [23 0647]   BP Pulse Resp Temp SpO2   (!) 133/91 87 17 98.4 °F (36.9 °C) 96 %      MAP       --         Physical Exam    Vitals reviewed.  Constitutional: She appears well-developed.   Cardiovascular:  Normal rate and regular rhythm.           No murmur heard.  Pulmonary/Chest: Breath sounds normal. She has no wheezes.   Musculoskeletal:      Comments: Pulses 2+ to the right upper extremity both radial and ulna, there is 1+ nonpitting edema noted to the right wrist, there is subjective tenderness to palpation there is no erythema, there is no calor.         ED Course   Procedures  Labs Reviewed - No data to  display       Imaging Results              X-Ray Wrist Complete Right (Final result)  Result time 05/18/23 07:51:01      Final result by Ab Pace MD (05/18/23 07:51:01)                   Impression:      No convincing evidence of acute fracture or dislocation.      Electronically signed by: Ab Pace  Date:    05/18/2023  Time:    07:51               Narrative:    EXAMINATION:  XR WRIST COMPLETE 3 VIEWS RIGHT    CLINICAL HISTORY:  Pain in unspecified wrist    TECHNIQUE:  PA, lateral, and oblique views of the right wrist were performed.    COMPARISON:  Bilateral wrist radiograph 12/22/2022.    FINDINGS:  No definite evidence of acute fracture or dislocation.  Joint spaces appear maintained.  Query minor degenerative narrowing at the radiocarpal joint.  No definite evidence of radiopaque foreign body.                                       Medications - No data to display  Medical Decision Making:   ED Management:  Patient was placed in a velcro volar splint.  Ambulatory referral to orthopedics for further workup was placed.  Patient instructed to return immediately for any new or worsening symptoms and she verbalized understanding.           ED Course as of 05/18/23 2259   Thu May 18, 2023   0756 X-Ray Wrist Complete Right  Reviewed, no acute findings [CD]   0821 X-Ray Wrist Complete Right  Reviewed, no acute findings [CD]      ED Course User Index  [CD] Agustín Troncoso DO                 Clinical Impression:   Final diagnoses:  [M25.539] Wrist pain (Primary)        ED Disposition Condition    Discharge Stable          ED Prescriptions       Medication Sig Dispense Start Date End Date Auth. Provider    naproxen (NAPROSYN) 500 MG tablet Take 1 tablet (500 mg total) by mouth 2 (two) times daily with meals. 10 tablet 5/18/2023 -- Agustín Troncoso DO    orphenadrine (NORFLEX) 100 mg tablet Take 1 tablet (100 mg total) by mouth 2 (two) times daily. 10 tablet 5/18/2023 -- Agustín Troncoso DO           Follow-up Information       Follow up With Specialties Details Why Contact Info    Farhat Justin MD Family Medicine Schedule an appointment as soon as possible for a visit   [address] [address]  [facility] [address] [phone]               Agustín Troncoso,   05/18/23 6954

## 2023-05-25 DIAGNOSIS — Z12.39 ENCOUNTER FOR SCREENING FOR MALIGNANT NEOPLASM OF BREAST: Primary | ICD-10-CM

## 2023-08-03 ENCOUNTER — HOSPITAL ENCOUNTER (OUTPATIENT)
Dept: RADIOLOGY | Facility: HOSPITAL | Age: 53
Discharge: HOME | End: 2023-08-03
Attending: FAMILY MEDICINE
Payer: MEDICAID

## 2023-08-03 DIAGNOSIS — Z12.39 ENCOUNTER FOR SCREENING FOR MALIGNANT NEOPLASM OF BREAST: ICD-10-CM

## 2023-08-03 PROCEDURE — 77063 MAMMO DIGITAL SCREENING BILAT WITH TOMO: ICD-10-PCS | Mod: 26,,, | Performed by: RADIOLOGY

## 2023-08-03 PROCEDURE — 77067 SCR MAMMO BI INCL CAD: CPT | Mod: TC

## 2023-08-03 PROCEDURE — 77063 BREAST TOMOSYNTHESIS BI: CPT | Mod: 26,,, | Performed by: RADIOLOGY

## 2023-08-03 PROCEDURE — 77067 SCR MAMMO BI INCL CAD: CPT | Mod: 26,,, | Performed by: RADIOLOGY

## 2023-08-03 PROCEDURE — 77067 MAMMO DIGITAL SCREENING BILAT WITH TOMO: ICD-10-PCS | Mod: 26,,, | Performed by: RADIOLOGY

## 2024-05-30 ENCOUNTER — HOSPITAL ENCOUNTER (EMERGENCY)
Facility: HOSPITAL | Age: 54
Discharge: HOME | End: 2024-05-30
Attending: EMERGENCY MEDICINE
Payer: MEDICAID

## 2024-05-30 VITALS
HEART RATE: 72 BPM | RESPIRATION RATE: 18 BRPM | OXYGEN SATURATION: 96 % | DIASTOLIC BLOOD PRESSURE: 80 MMHG | TEMPERATURE: 99 F | SYSTOLIC BLOOD PRESSURE: 142 MMHG

## 2024-05-30 DIAGNOSIS — I10 PRIMARY HYPERTENSION: ICD-10-CM

## 2024-05-30 DIAGNOSIS — M54.41 ACUTE RIGHT-SIDED LOW BACK PAIN WITH RIGHT-SIDED SCIATICA: Primary | ICD-10-CM

## 2024-05-30 PROCEDURE — 99284 EMERGENCY DEPT VISIT MOD MDM: CPT | Mod: 25

## 2024-05-30 PROCEDURE — 96372 THER/PROPH/DIAG INJ SC/IM: CPT

## 2024-05-30 PROCEDURE — 25000003 PHARM REV CODE 250

## 2024-05-30 PROCEDURE — 81003 URINALYSIS AUTO W/O SCOPE: CPT

## 2024-05-30 PROCEDURE — 63600175 PHARM REV CODE 636 W HCPCS

## 2024-05-30 RX ORDER — LIDOCAINE 50 MG/G
1 PATCH TOPICAL DAILY
Qty: 6 PATCH | Refills: 0 | Status: SHIPPED | OUTPATIENT
Start: 2024-05-30

## 2024-05-30 RX ORDER — NAPROXEN 500 MG/1
500 TABLET ORAL 2 TIMES DAILY
Qty: 60 TABLET | Refills: 0 | Status: SHIPPED | OUTPATIENT
Start: 2024-05-30

## 2024-05-30 RX ORDER — LIDOCAINE 50 MG/G
1 PATCH TOPICAL
Status: DISCONTINUED | OUTPATIENT
Start: 2024-05-30 | End: 2024-05-30 | Stop reason: HOSPADM

## 2024-05-30 RX ORDER — METHOCARBAMOL 500 MG/1
500 TABLET, FILM COATED ORAL 4 TIMES DAILY
Qty: 40 TABLET | Refills: 0 | Status: SHIPPED | OUTPATIENT
Start: 2024-05-30 | End: 2024-06-09

## 2024-05-30 RX ORDER — AMLODIPINE BESYLATE 5 MG/1
5 TABLET ORAL DAILY
Qty: 30 TABLET | Refills: 0 | Status: SHIPPED | OUTPATIENT
Start: 2024-05-30 | End: 2024-06-29

## 2024-05-30 RX ORDER — KETOROLAC TROMETHAMINE 30 MG/ML
15 INJECTION, SOLUTION INTRAMUSCULAR; INTRAVENOUS
Status: COMPLETED | OUTPATIENT
Start: 2024-05-30 | End: 2024-05-30

## 2024-05-30 RX ORDER — PROPRANOLOL HYDROCHLORIDE 120 MG/1
120 CAPSULE, EXTENDED RELEASE ORAL DAILY
Qty: 30 CAPSULE | Refills: 0 | Status: SHIPPED | OUTPATIENT
Start: 2024-05-30 | End: 2024-06-29

## 2024-05-30 RX ADMIN — KETOROLAC TROMETHAMINE 15 MG: 30 INJECTION, SOLUTION INTRAMUSCULAR at 08:05

## 2024-05-30 RX ADMIN — LIDOCAINE 1 PATCH: 50 PATCH TOPICAL at 08:05

## 2024-05-30 NOTE — ED NOTES
Patient reports no change to pain. She reports pain is still a 8/10 in lower back and right hip. Secure message sent to provider.

## 2024-05-30 NOTE — DISCHARGE INSTRUCTIONS
[facility]  [facility] [place]  Address  [address] [place] [address] [place] [address]  Phone  [phone]    [facility]  [address] [place] [address]  [phone]         Marci por dejarme cuidar de ti en el audra de hoy! Fue un placer conocerte, y espero que te sientas mejor pronto. Tambien aqui le dejo informaciones/ instrucciones sobre tristan plan/ tratamiento médico:  -  -  -  -    Si desea acceder tristan expediente médico y lo que se hizo hoy, utilice la aplicación de [facility]. No dude en regresar si toshia síntomas empeoran o si desarrolla cualquier otro síntoma preocupante.     Nuestro objetivo en el departamento de emergencias es siempre brindarle tobin atención y un servicio excepcional. Es posible que reciba tobin encuesta por correo postal o electrónico en la  próxima semana con respecto a tristan experiencia en nuestra desiree de emergencias. Le agradeceria mucho si completara la encuesta compartiendo tristan experiencia con nosotros. Toshia comentarios nos proporcionan tobin manera de reconocer a nuestro personal que moe muy buena atencion y ayuda a nuestros queridos pacientes, al igual nos ayuda aprender olivia mejorar el cuidado y servicio que ofrecemos debajo de nuestra aspiración de excelencia.    Atentamente,    Marnie Obrien PA-C  [facility]  [facility]

## 2024-05-30 NOTE — ED PROVIDER NOTES
Encounter Date: 2024       History     Chief Complaint   Patient presents with    Back Pain     Patient presents to ED with c/o pain to right side of waist that began last night. Patient reports she lifted a heavy sack of sugar yesterday. Patient reports a hx of lumbar back pain. Denies taking any medications for pain.      53-year-old female with past medical history of migraine headaches, hypertension presents to the ED with lower back pain x1 day.  Patient notes constant right lower back pain pain that radiates down her right leg since yesterday. States she bent down and lifted something a heavy sack.  No urinary symptoms.  Denies numbness and tingling.  No bladder/ bowel incontinence, saddle anesthesia.  Has been able to bear weight and ambulate however notes a slight limp d/t pain.  No recent direct injury or trauma to the back.  No history of back surgery.  No treatments attempted prior to arrival.  Denies fever, nausea, vomiting, hematuria, chest pain, shortness of breath, abdominal pain.  No other acute complaints today.          The history is provided by the patient. No  was used.     Review of patient's allergies indicates:   Allergen Reactions    Topiramate      Past Medical History:   Diagnosis Date    Disorder of kidney and ureter     Hypertension     Migraine headache      Past Surgical History:   Procedure Laterality Date     SECTION      REDUCTION OF BOTH BREASTS Bilateral     TOTAL REDUCTION MAMMOPLASTY Bilateral     TUBAL LIGATION       Family History   Family history unknown: Yes     Social History     Tobacco Use    Smoking status: Every Day     Current packs/day: 0.35     Types: Cigarettes    Smokeless tobacco: Never   Substance Use Topics    Alcohol use: No    Drug use: No     Review of Systems   Constitutional:  Negative for chills and fever.   HENT:  Negative for congestion.    Respiratory:  Negative for cough, shortness of breath and wheezing.     Cardiovascular:  Negative for chest pain.   Gastrointestinal:  Negative for abdominal distention, abdominal pain, nausea and vomiting.   Genitourinary:  Negative for dysuria and frequency.   Musculoskeletal:  Positive for back pain. Negative for neck pain and neck stiffness.       Physical Exam     Initial Vitals [05/30/24 0748]   BP Pulse Resp Temp SpO2   138/87 85 18 98.7 °F (37.1 °C) 98 %      MAP       --         Physical Exam    Vitals reviewed.  Constitutional: She appears well-developed and well-nourished. She is not diaphoretic. No distress.   HENT:   Head: Normocephalic and atraumatic.   Right Ear: External ear normal.   Left Ear: External ear normal.   Mouth/Throat: Oropharynx is clear and moist.   Eyes: EOM are normal. Pupils are equal, round, and reactive to light.   Neck: Neck supple.   Normal range of motion.  Cardiovascular:  Normal rate and normal heart sounds.           Pulmonary/Chest: Breath sounds normal. No respiratory distress. She has no wheezes.   Abdominal: Abdomen is soft. Bowel sounds are normal. She exhibits no distension. There is no abdominal tenderness.   Musculoskeletal:         General: Tenderness present.      Cervical back: Normal range of motion and neck supple.      Comments: There is mild TTP over the right paraspinal muscle radiating posterior down to right leg. No C,T,L midline spine TTP. No bony tenderness or deformities noted.  Positive right straight leg test     Neurological: She is alert and oriented to person, place, and time.   Skin: Skin is warm. Capillary refill takes less than 2 seconds.   Psychiatric: She has a normal mood and affect. Her behavior is normal. Judgment and thought content normal.         ED Course   Procedures  Labs Reviewed   URINALYSIS, REFLEX TO URINE CULTURE    Narrative:     Specimen Source->Urine          Imaging Results    None          Medications   LIDOcaine 5 % patch 1 patch (1 patch Transdermal Patch Applied 5/30/24 4021)   ketorolac  injection 15 mg (15 mg Intramuscular Given 5/30/24 0833)     Medical Decision Making  Differential Diagnosis includes, but is not limited to:  Fracture, dislocation, compartment syndrome, nerve injury/palsy, vascular injury, DVT, rhabdomyolysis, hemarthrosis, septic joint, cellulitis, bursitis, muscle strain, ligament tear/sprain, laceration, foreign body, abrasion, soft tissue contusion, osteoarthritis.      ED management     53-year-old female with past medical history of migraine headaches, hypertension presents to the ED with lower back pain x1 day. Patient is not toxic appearing, hemodynamically stable and resting comfortably on bed. Patient is well-appearing.  Awake and alert.  Afebrile with vitals WNL. No distress on exam. Patient presenting with acute onset back pain. No red flags on history.  No evidence for cauda equina, spinal infection, bony injury, other significant pathology.  The patient did not have any urinary incontinence, bowel incontinence, saddle anesthesia, fever, or weight loss that would advanced warrant imaging. Possibly herniated disc but pt has no neurologic deficits, no need for advanced imaging at this time as the majority of disc herniations will regress or reabsorb within eight weeks of onset. In the absence of progressive neurologic deficits or other red flags, there is strong evidence to avoid CT/MRI imaging in patients with non-specific low back pain. Provided with Toradol and Lidocaine patches while in the ED. Will treat with NSAIDs, muscle relaxants, and lidoderm patches. Have discussed that back pain can persist for multiple weeks however most people resolve with symptom treatment.  Will instruct pt to follow up with PCP in one week if symptoms do not improve for reevaluation. We disscused at length warning signs for return  including but not limited to increased pain, change in gait, sensation changes around the rectum or perineum, bowel or bladder issues, fever and the pt  understands and they received written instructions.    I have discussed the specifics of the workup with the patient and the patient has verbalized understanding of the details of the workup, the diagnosis, the treatment plan, and the need for outpatient follow-up with PCP. ED precautions given. Discussed with pt about returning to the ED, if symptoms fail to improve or worsen.    RESULTS:  Documented in ED course.   Labs/ekg interpreted by myself       Voice recognition software utilized in this note. Typographical and content errors may occur with this process. While efforts are made to detect and correct such errors, in some cases errors will persist. For this reason, wording in this document should be considered in the proper context and not strictly verbatim.       Amount and/or Complexity of Data Reviewed  Labs:  Decision-making details documented in ED Course.    Risk  Prescription drug management.               ED Course as of 05/30/24 0955   Thu May 30, 2024   0904 Urinalysis, Reflex to Urine Culture Urine, Clean Catch  No leukocytes, nitrates, occult blood. No evidence of UTI. [NW]      ED Course User Index  [NW] Marnie Obrien PA-C                           Clinical Impression:  Final diagnoses:  [M54.41] Acute right-sided low back pain with right-sided sciatica (Primary)          ED Disposition Condition    Discharge Stable          ED Prescriptions       Medication Sig Dispense Start Date End Date Auth. Provider    methocarbamoL (ROBAXIN) 500 MG Tab Take 1 tablet (500 mg total) by mouth 4 (four) times daily. for 10 days 40 tablet 5/30/2024 6/9/2024 Marnie Obrien PA-C    LIDOcaine (LIDODERM) 5 % Place 1 patch onto the skin once daily. Remove & Discard patch within 12 hours or as directed by MD Simon patch 5/30/2024 -- Marnie Obrien PA-C    naproxen (NAPROSYN) 500 MG tablet Take 1 tablet (500 mg total) by mouth 2 (two) times daily. 60 tablet 5/30/2024 -- Marnie Obrien PA-C          Follow-up Information        Follow up With Specialties Details Why Contact Info Additional Information    [facility] - Neurosurgery Neurosurgery Schedule an appointment as soon as possible for a visit in 1 week As needed, If symptoms worsen [address]  [address]  [facility] [address] [phone] [address] Please park in [address] or D and use [facility]. Take Medical Office Bldg. elevators.             Marnie Obrien PA-C  05/30/24 6120

## 2024-07-10 ENCOUNTER — PATIENT MESSAGE (OUTPATIENT)
Dept: ORTHOPEDICS | Facility: CLINIC | Age: 54
End: 2024-07-10
Payer: MEDICAID

## 2024-07-10 DIAGNOSIS — M25.562 LEFT KNEE PAIN, UNSPECIFIED CHRONICITY: Primary | ICD-10-CM

## 2024-07-10 DIAGNOSIS — M25.561 RIGHT KNEE PAIN, UNSPECIFIED CHRONICITY: ICD-10-CM

## 2024-07-11 ENCOUNTER — OFFICE VISIT (OUTPATIENT)
Dept: ORTHOPEDICS | Facility: CLINIC | Age: 54
End: 2024-07-11
Payer: MEDICAID

## 2024-07-11 DIAGNOSIS — M17.0 PRIMARY OSTEOARTHRITIS OF BOTH KNEES: Primary | ICD-10-CM

## 2024-07-11 DIAGNOSIS — M17.9 OSTEOARTHRITIS OF KNEE, UNSPECIFIED: ICD-10-CM

## 2024-07-11 DIAGNOSIS — G89.29 CHRONIC PAIN OF RIGHT KNEE: ICD-10-CM

## 2024-07-11 DIAGNOSIS — M25.562 CHRONIC PAIN OF LEFT KNEE: ICD-10-CM

## 2024-07-11 DIAGNOSIS — M25.561 CHRONIC PAIN OF RIGHT KNEE: ICD-10-CM

## 2024-07-11 DIAGNOSIS — G89.29 CHRONIC PAIN OF LEFT KNEE: ICD-10-CM

## 2024-07-11 PROCEDURE — 99213 OFFICE O/P EST LOW 20 MIN: CPT | Mod: PBBFAC,PN

## 2024-07-11 PROCEDURE — 20610 DRAIN/INJ JOINT/BURSA W/O US: CPT | Mod: 50,PBBFAC,PN

## 2024-07-11 PROCEDURE — 99999 PR PBB SHADOW E&M-EST. PATIENT-LVL III: CPT | Mod: PBBFAC,,,

## 2024-07-11 PROCEDURE — 99999PBSHW PR PBB SHADOW TECHNICAL ONLY FILED TO HB: Mod: PBBFAC,,,

## 2024-07-11 RX ADMIN — TRIAMCINOLONE ACETONIDE 40 MG: 40 INJECTION, SUSPENSION INTRA-ARTICULAR; INTRAMUSCULAR at 09:07

## 2024-07-11 NOTE — PROGRESS NOTES
Subjective:      Patient ID: Clayton Sandoval is a 53 y.o. female.    Chief Complaint: Pain of the Right Knee and Pain of the Left Knee      HPI: Clayton Sandoval is a 53 y.o. female who presents to clinic for intermittent bilateral knee pain (L > R). Patient denies known SHELLIE. The pain started 3 years ago and is becoming progressively worse. Pain is located diffusely. She reports that the pain is a 10/10 cramping, throbbing pain today and 10/10 at its worst.  The pain is aggravated by walking and standing, but the pain is the worst at night when she is trying to go to sleep.  Associated symptoms include swelling, giving way, pseudolocking, and gelling.  Patient does report multiple falls due to the knee giving out. Denies numbness, tingling, radiation and inability to bear weight.. The pain is affecting ADLs and limiting desired level of activity. There is not a history of previous surgery to the knee.      Previous treatments include Naproxen 500 mg (from PCP) which has provided poor relief.     Patient declined translation service, son at bedside to assist with translating.      Occupation: [employer] (stands a lot)    Ambulating: unassisted  Diabetic:  Yes (most recent Hemoglobin A1C: not on file)  Smoking:  She currently smokes 1 packs per every 3 days.   History of DVT/PE: Negative    PAST MEDICAL HISTORY:    Past Medical History:   Diagnosis Date    Disorder of kidney and ureter     Hypertension     Migraine headache      PAST SURGICAL HISTORY:    Past Surgical History:   Procedure Laterality Date     SECTION      REDUCTION OF BOTH BREASTS Bilateral     TOTAL REDUCTION MAMMOPLASTY Bilateral     TUBAL LIGATION       FAMILY HISTORY:    Family History   Family history unknown: Yes     SOCIAL HISTORY:    Social History     Occupational History    Not on file   Tobacco Use    Smoking status: Every Day     Current packs/day: 0.35     Types: Cigarettes    Smokeless tobacco: Never   Substance and Sexual  Activity    Alcohol use: No    Drug use: No    Sexual activity: Yes      MEDICATIONS:   Current Outpatient Medications:     amLODIPine (NORVASC) 5 MG tablet, Take 1 tablet (5 mg total) by mouth once daily., Disp: 30 tablet, Rfl: 0    estradiol-norethindrone (ACTIVELLA) 1-0.5 mg per tablet, Take 1 tablet by mouth once daily., Disp: 30 tablet, Rfl: 11    LIDOcaine (LIDODERM) 5 %, Place 1 patch onto the skin once daily. Remove & Discard patch within 12 hours or as directed by MD, Disp: 6 patch, Rfl: 0    metFORMIN (GLUCOPHAGE) 500 MG tablet, Take 500 mg by mouth 2 (two) times daily., Disp: , Rfl:     naproxen (NAPROSYN) 500 MG tablet, Take 1 tablet (500 mg total) by mouth 2 (two) times daily., Disp: 60 tablet, Rfl: 0    orphenadrine (NORFLEX) 100 mg tablet, Take 1 tablet (100 mg total) by mouth 2 (two) times daily., Disp: 10 tablet, Rfl: 0    propranoloL (INDERAL LA) 120 MG 24 hr capsule, Take 1 capsule (120 mg total) by mouth once daily., Disp: 30 capsule, Rfl: 0    ALLERGIES:   Review of patient's allergies indicates:   Allergen Reactions    Topiramate        Review of Systems:  Constitution: Negative for chills, fever and night sweats.   HENT: Negative for congestion and headaches.    Eyes: Negative for blurred vision or vision loss.  Cardiovascular: Negative for chest pain and syncope.   Respiratory: Negative for cough and shortness of breath.    Endocrine: Negative for polydipsia, polyphagia and polyuria.   Hematologic/Lymphatic: Negative for bleeding problem. Does not bruise/bleed easily.   Skin: Negative for dry skin, itching and rash.   Musculoskeletal: See HPI.   Gastrointestinal: Negative for abdominal pain and bowel incontinence.   Genitourinary: Negative for bladder incontinence and nocturia.   Neurological: Negative for disturbances in coordination, loss of balance and seizures.   Psychiatric/Behavioral: Negative for depression. The patient does not have insomnia.    Allergic/Immunologic: Negative for  hives and persistent infections.          Objective:        There were no vitals filed for this visit.    PHYSICAL EXAM:  General: Alert & oriented x3, well-developed and well-nourished, in no acute distress, sitting comfortably in the exam room.  Skin: Warm and dry. Capillary refill less than 2 seconds.   Head: Normocephalic and atraumatic.   Eyes: Sclera appear normal.   Nose: No deformities seen.   Ears: No deformities seen.   Neck: No tracheal deviation present.   Pulmonary/Chest: Breathing unlabored.   Neurological: Alert and oriented to person, place, and time.   Psychiatric: Mood is pleasant and affect appropriate.           Body habitus is overweight.         The patient walks with a limp.    RIGHT KNEE        Hip irritability:  negative.         The skin over the knee is intact.        Knee effusion:  trace         Tenderness:  medial and lateral joint lines        Range of Motion:  Flexion -- full, Extension -- full        Ligament exam:   MCL:  trace laxity   Lachman:  intact              Posterior sag:  intact    LCL:  intact        Patellar apprehension:  negative.        Popliteal cyst:  negative.        Patellar crepitation:  absent.        Pulses DP present, PT present.        Motor:  normal 5/5 strength in all tested muscle groups.         Sensory:  normal.    LEFT KNEE        Hip irritability:  negative.         The skin over the knee is intact.        Knee effusion:  trace         Tenderness:  medial and lateral joint lines        Range of Motion:  Flexion -- full, Extension -- full        Ligament exam:   MCL:  trace laxity   Lachman:  intact              Posterior sag:  intact    LCL:  intact        Patellar apprehension:  negative.        Popliteal cyst:  negative.        Patellar crepitation:  absent.        Pulses DP present, PT present.        Motor:  normal 5/5 strength in all tested muscle groups.         Sensory:  normal.      Imaging:   X-Rays: 4 views of bilateral knee obtained in the  Orthopedic clinic today, and independently reviewed, show: Degenerative changes bilaterally including medial joint space narrowing and subchondral sclerosis. No acute fractures or dislocations. No evidence of foreign bodies.         Assessment:       1. Primary osteoarthritis of both knees    2. Chronic pain of left knee    3. Chronic pain of right knee    4. Osteoarthritis of knee, unspecified        Plan:       Orders Placed This Encounter    Large Joint Aspiration/Injection: bilateral knee     I explained the nature of the problem to the patient.     I discussed at length with the patient all the different treatment options available for her bilateral knees including anti-inflammatories, acetaminophen, bracing, rest, ice, heat, physical therapy, corticosteroid injections, viscosupplement injections, and Iovera.     I explained the potential role of knee replacement in the treatment of this condition. I also explained the typical clinical course.  The usual complications that that can occur were also discussed. The patient understands that if non-surgical measures do not adequately control symptoms, surgery will be considered in the future. The patient agrees to discuss this again at follow-up, if clinically warranted.    Medications:  Continue previously prescribed Naproxen 500 mg BID as needed for pain management.    Procedures:  Corticosteroid injection requested and performed today to the bilateral knee. See procedure note. Standard precautions provided.  Pain Management: Ice compress to the affected area 2-3x a day for 15-20 minutes as needed for pain management.    Follow-Up: 3 months with Christine Macedo PA-C for follow-up and possible repeat corticosteroid injection.     All of the patient's questions were answered and the patient will contact us if they have any questions or concerns in the interim.      Christine Macedo PA-C  [facility]  Orthopedic Surgery    Medical Dictation software was used  during the dictation of portions or the entirety of this medical record.  Phonetic or grammatic errors may exist due to the use of this software. For clarification, refer to the author of the document.

## 2024-07-12 RX ORDER — TRIAMCINOLONE ACETONIDE 40 MG/ML
40 INJECTION, SUSPENSION INTRA-ARTICULAR; INTRAMUSCULAR
Status: DISCONTINUED | OUTPATIENT
Start: 2024-07-11 | End: 2024-07-12 | Stop reason: HOSPADM

## 2024-07-12 NOTE — PROCEDURES
Large Joint Aspiration/Injection: bilateral knee    Date/Time: 7/11/2024 9:30 AM    Performed by: Christine Macedo PA-C  Authorized by: Christine Macedo PA-C    Consent Done?:  Yes (Verbal)  Indications:  Pain and arthritis  Site marked: the procedure site was marked    Timeout: prior to procedure the correct patient, procedure, and site was verified      Local anesthesia used?: Yes    Local anesthetic:  Topical anesthetic    Details:  Needle Size:  22 G  Ultrasonic Guidance for needle placement?: No    Approach:  Anterolateral  Location:  Knee  Laterality:  Bilateral  Site:  Bilateral knee  Medications (Right):  40 mg triamcinolone acetonide 40 mg/mL  Medications (Left):  40 mg triamcinolone acetonide 40 mg/mL  Patient tolerance:  Patient tolerated the procedure well with no immediate complications    Injection Procedure Note   Prior to procedure the correct patient, procedure, and site was verified. Allergies were reviewed and verbal consent was obtained. The procedure site was marked.    After time out was performed, the patient was prepped aseptically with chloraprep, the area was sprayed with local topical anesthetic, and then cleaned with alcohol. A therapeutic injection of combination of 2 cc 1% Xylocaine and 40mg Triamcinolone was given under sterile technique using a 22-gauge x 1.5 needle from the anterolateral aspect of the bilateral knee joint. Sterile dressing applied.     Patient tolerated the procedure well. Pain decreased. She had no adverse reactions to the medication.     The patient is cautioned that immediate relief of pain is secondary to the local anesthetic and will be temporary. After the anesthetic wears off there may be a increase in pain that may last for a few hours or a few days. Advised patient to avoid strenuous activities for the next 24-48 hours and to apply ice for 20 minutes to help alleviate this this pain. She was warned of possible blood sugar and/or blood pressure  changes following injection. She was reminded to call the clinic immediately for any adverse side effects as explained in clinic today.

## 2024-07-30 ENCOUNTER — TELEPHONE (OUTPATIENT)
Dept: OBSTETRICS AND GYNECOLOGY | Facility: CLINIC | Age: 54
End: 2024-07-30
Payer: MEDICAID

## 2024-07-30 NOTE — TELEPHONE ENCOUNTER
Spoke with pt and informed that she will need to make an appt for an annual in order to get the mammogram order. Pt informed that dr segura usually orders her mammogram with no issues. Informed pt her recent mammogram was order by her np. Informed pt she can call her nurse practioner and request the order.     Pt verbalized understanding     ----- Message from Chantell Thibodeaux sent at 7/30/2024  9:10 AM CDT -----  Type:  Mammogram    Caller is requesting to schedule their annual mammogram appointment.  Order is not listed in EPIC.  Please enter order and contact patient to schedule.  Name of Caller: pt   Where would they like the mammogram performed?[facility]  Would the patient rather a call back or a response via [facility]?  Call back   Best Call Back Number:[phone]  Additional Information:

## 2024-08-01 DIAGNOSIS — Z12.39 ENCOUNTER FOR OTHER SCREENING FOR MALIGNANT NEOPLASM OF BREAST: Primary | ICD-10-CM

## 2024-09-05 ENCOUNTER — HOSPITAL ENCOUNTER (OUTPATIENT)
Dept: RADIOLOGY | Facility: HOSPITAL | Age: 54
Discharge: HOME | End: 2024-09-05
Attending: FAMILY MEDICINE
Payer: MEDICAID

## 2024-09-05 DIAGNOSIS — Z12.39 ENCOUNTER FOR OTHER SCREENING FOR MALIGNANT NEOPLASM OF BREAST: ICD-10-CM

## 2024-09-05 PROCEDURE — 77067 SCR MAMMO BI INCL CAD: CPT | Mod: TC

## 2024-09-30 ENCOUNTER — TELEPHONE (OUTPATIENT)
Dept: ORTHOPEDICS | Facility: CLINIC | Age: 54
End: 2024-09-30
Payer: MEDICAID

## 2024-10-24 ENCOUNTER — OFFICE VISIT (OUTPATIENT)
Dept: ORTHOPEDICS | Facility: CLINIC | Age: 54
End: 2024-10-24
Payer: MEDICAID

## 2024-10-24 DIAGNOSIS — M17.0 PRIMARY OSTEOARTHRITIS OF BOTH KNEES: Primary | ICD-10-CM

## 2024-10-24 DIAGNOSIS — M25.561 CHRONIC PAIN OF RIGHT KNEE: ICD-10-CM

## 2024-10-24 DIAGNOSIS — G89.29 CHRONIC PAIN OF RIGHT KNEE: ICD-10-CM

## 2024-10-24 DIAGNOSIS — M25.562 CHRONIC PAIN OF LEFT KNEE: ICD-10-CM

## 2024-10-24 DIAGNOSIS — G89.29 CHRONIC PAIN OF LEFT KNEE: ICD-10-CM

## 2024-10-24 PROCEDURE — 99999 PR PBB SHADOW E&M-EST. PATIENT-LVL III: CPT | Mod: PBBFAC,,,

## 2024-10-24 PROCEDURE — 99999PBSHW PR PBB SHADOW TECHNICAL ONLY FILED TO HB: Mod: PBBFAC,,,

## 2024-10-24 PROCEDURE — 99213 OFFICE O/P EST LOW 20 MIN: CPT | Mod: PBBFAC,PN

## 2024-10-24 PROCEDURE — 20610 DRAIN/INJ JOINT/BURSA W/O US: CPT | Mod: 50,PBBFAC,PN

## 2024-10-24 RX ORDER — TRIAMCINOLONE ACETONIDE 40 MG/ML
40 INJECTION, SUSPENSION INTRA-ARTICULAR; INTRAMUSCULAR
Status: DISCONTINUED | OUTPATIENT
Start: 2024-10-24 | End: 2024-10-24 | Stop reason: HOSPADM

## 2024-10-24 RX ORDER — NAPROXEN 500 MG/1
500 TABLET ORAL 2 TIMES DAILY WITH MEALS
Qty: 60 TABLET | Refills: 2 | Status: SHIPPED | OUTPATIENT
Start: 2024-10-24

## 2024-10-24 RX ORDER — NAPROXEN 500 MG/1
500 TABLET ORAL 2 TIMES DAILY
Qty: 60 TABLET | Refills: 0 | Status: SHIPPED | OUTPATIENT
Start: 2024-10-24 | End: 2024-10-24

## 2024-10-24 RX ADMIN — TRIAMCINOLONE ACETONIDE 40 MG: 40 INJECTION, SUSPENSION INTRA-ARTICULAR; INTRAMUSCULAR at 08:10

## 2024-10-24 NOTE — PROCEDURES
Large Joint Aspiration/Injection: bilateral knee    Date/Time: 10/24/2024 8:30 AM    Performed by: Christine Macedo PA-C  Authorized by: Christine Macedo PA-C    Consent Done?:  Yes (Verbal)  Indications:  Arthritis  Site marked: the procedure site was marked    Timeout: prior to procedure the correct patient, procedure, and site was verified      Local anesthesia used?: Yes    Local anesthetic:  Topical anesthetic    Details:  Needle Size:  22 G  Ultrasonic Guidance for needle placement?: No    Approach:  Anterolateral  Location:  Knee  Laterality:  Bilateral  Site:  Bilateral knee  Medications (Right):  40 mg triamcinolone acetonide 40 mg/mL  Medications (Left):  40 mg triamcinolone acetonide 40 mg/mL  Patient tolerance:  Patient tolerated the procedure well with no immediate complications     Injection Procedure Note   Prior to procedure the correct patient, procedure, and site was verified. Allergies were reviewed and verbal consent was obtained. The procedure site was marked.    After time out was performed, the patient was prepped aseptically with chloraprep, the area was sprayed with local topical anesthetic, and then cleaned with alcohol. A therapeutic injection of combination of 2 cc 1% Xylocaine and 40mg Triamcinolone was given under sterile technique using a 22-gauge x 1.5 needle from the anterolateral aspect of the bilateral knee joint. Sterile dressing applied.     Patient tolerated the procedure well. Pain decreased. She had no adverse reactions to the medication.     The patient is cautioned that immediate relief of pain is secondary to the local anesthetic and will be temporary. After the anesthetic wears off there may be a increase in pain that may last for a few hours or a few days. Advised patient to avoid strenuous activities for the next 24-48 hours and to apply ice for 20 minutes to help alleviate this this pain. She was warned of possible blood sugar and/or blood pressure changes  following injection. She was reminded to call the clinic immediately for any adverse side effects as explained in clinic today.

## 2024-10-24 NOTE — PROGRESS NOTES
Subjective:      Patient ID: Clayton Sandoval is a 53 y.o. female.    Chief Complaint: Pain of the Left Knee and Pain of the Right Knee      HPI: Clayton Sandoval is a 53 y.o. female who presents to clinic for follow up of bilateral knee osteoarthritis and pain. Patient was last seen by myself on 07/11/2024 for this and corticosteroid injection was performed.  She reports symptoms returned around 1 month after injection was performed. She reports that the pain is a 8/10 pain today and a 10/10 at its worst. She denies a history of trauma, but symptoms have been present for multiple years.  The pain is aggravated by walking and standing. Patient states she is no longer having pain worse at night. Associated symptoms include swelling, giving way, pseudolocking, and gelling.  Patient does report multiple falls due to the knee giving out. Denies numbness, tingling, radiation and inability to bear weight. The pain is affecting ADLs and limiting desired level of activity. Previous treatments include Tylenol, Naproxen, activity modifications, corticosteroid injections, and rest which have provided some temporary relief.     She has received good relief with knee injections in the past (last injection on 07/11/2024) and is requesting repeat cortisone injection today in the bilateral knee.    Of note, patient did previously see Dr. Sneed with [facility] on 09/16/2024 for bilateral shoulder and bilateral knee pain.  Patient was referred to physical therapy and advised to take Tylenol.  Patient was also referred to Rheumatology for her polyarthralgia.    Translation service with professional interpretor was used today for the entirety of the visit.        Occupation: [employer] (stands a lot)    Ambulating: unassisted  Diabetic:  Yes (most recent Hemoglobin A1C: not on file)  Smoking:  She currently smokes 1 pack over a 3 day span.   History of DVT/PE: Negative      PAST MEDICAL HISTORY:    Past Medical History:   Diagnosis  Date    Disorder of kidney and ureter     Hypertension     Migraine headache      MEDICATIONS:   Current Outpatient Medications:     metFORMIN (GLUCOPHAGE) 500 MG tablet, Take 500 mg by mouth 2 (two) times daily., Disp: , Rfl:     orphenadrine (NORFLEX) 100 mg tablet, Take 1 tablet (100 mg total) by mouth 2 (two) times daily., Disp: 10 tablet, Rfl: 0    amLODIPine (NORVASC) 5 MG tablet, Take 1 tablet (5 mg total) by mouth once daily., Disp: 30 tablet, Rfl: 0    estradiol-norethindrone (ACTIVELLA) 1-0.5 mg per tablet, Take 1 tablet by mouth once daily., Disp: 30 tablet, Rfl: 11    LIDOcaine (LIDODERM) 5 %, Place 1 patch onto the skin once daily. Remove & Discard patch within 12 hours or as directed by MD (Patient not taking: Reported on 10/24/2024), Disp: 6 patch, Rfl: 0    naproxen (NAPROSYN) 500 MG tablet, Take 1 tablet (500 mg total) by mouth 2 (two) times daily with meals., Disp: 60 tablet, Rfl: 2    propranoloL (INDERAL LA) 120 MG 24 hr capsule, Take 1 capsule (120 mg total) by mouth once daily., Disp: 30 capsule, Rfl: 0    ALLERGIES:   Review of patient's allergies indicates:   Allergen Reactions    Topiramate        Review of Systems:  Constitution: Negative for chills, fever and night sweats.   HENT: Negative for congestion and headaches.    Eyes: Negative for blurred vision or vision loss.  Cardiovascular: Negative for chest pain and syncope.   Respiratory: Negative for cough and shortness of breath.    Endocrine: Negative for polydipsia, polyphagia and polyuria.   Hematologic/Lymphatic: Negative for bleeding problem. Does not bruise/bleed easily.   Skin: Negative for dry skin, itching and rash.   Musculoskeletal: See HPI.   Gastrointestinal: Negative for abdominal pain and bowel incontinence.   Genitourinary: Negative for bladder incontinence and nocturia.   Neurological: Negative for disturbances in coordination, loss of balance and seizures.   Psychiatric/Behavioral: Negative for depression. The patient  does not have insomnia.    Allergic/Immunologic: Negative for hives and persistent infections.          Objective:      There were no vitals filed for this visit.    PHYSICAL EXAM:  General: Alert & oriented x3, well-developed and well-nourished, in no acute distress, sitting comfortably in the exam room.  Skin: Warm and dry. Capillary refill less than 2 seconds.   Head: Normocephalic and atraumatic.   Eyes: Sclera appear normal.   Nose: No deformities seen.   Ears: No deformities seen.   Neck: No tracheal deviation present.   Pulmonary/Chest: Breathing unlabored.   Neurological: Alert and oriented to person, place, and time.   Psychiatric: Mood is pleasant and affect appropriate.     BILATERAL KNEES        Body habitus is overweight.         The patient walks with a limp.        The skin over the knee is intact. No signs of infection.         Knee effusion:  none         Pulses DP present, PT present.        Motor:  normal 5/5 strength in all tested muscle groups.         Sensory:  normal.    Imaging:   X-Rays: 4 views of bilateral knee dated 09/16/2024, and independently reviewed, show: Degenerative changes bilaterally including medial joint space narrowing and subchondral sclerosis. No acute fractures or dislocations. No evidence of foreign bodies.         Assessment:       1. Primary osteoarthritis of both knees    2. Chronic pain of left knee    3. Chronic pain of right knee        Plan:       Orders Placed This Encounter    Large Joint Aspiration/Injection: bilateral knee    naproxen (NAPROSYN) 500 MG tablet     I explained the nature of the problem to the patient.     I discussed at length with the patient all the different treatment options available for her bilateral knees including anti-inflammatories, acetaminophen, bracing, rest, ice, heat, physical therapy to include strengthening exercise, weight loss, corticosteroid injections, viscosupplement injections, and Iovera.     I explained the potential role  of knee replacement in the treatment of this condition. I also explained the typical clinical course.  The usual complications that that can occur were also discussed. The patient understands that if non-surgical measures do not adequately control symptoms, surgery will be considered in the future. The patient agrees to discuss this again at follow-up, if clinically warranted.    Medications:  Continue OTC Tylenol Arthritis and previously prescribed Naproxen 500 mg BID as needed for pain management.   Physical Therapy:  Continue with previously ordered physical therapy for knee ROM, stretching, strengthening, and conditioning.  Procedures:  Corticosteroid injection requested and performed today to the bilateral knee. See procedure note. Standard precautions provided.  Pain Management: Ice compress to the affected area 2-3x a day for 15-20 minutes as needed for pain management.    Follow-Up: 3 months with Christine Macedo PA-C for follow-up and repeat corticosteroid injections.    All of the patient's questions were answered and the patient will contact us if they have any questions or concerns in the interim.    Christine Macedo PA-C  [facility]  Orthopedic Surgery    Medical Dictation software was used during the dictation of portions or the entirety of this medical record.  Phonetic or grammatic errors may exist due to the use of this software. For clarification, refer to the author of the document.

## 2025-01-24 ENCOUNTER — PATIENT MESSAGE (OUTPATIENT)
Dept: ORTHOPEDICS | Facility: CLINIC | Age: 55
End: 2025-01-24
Payer: MEDICAID

## 2025-01-24 ENCOUNTER — TELEPHONE (OUTPATIENT)
Dept: ORTHOPEDICS | Facility: CLINIC | Age: 55
End: 2025-01-24
Payer: MEDICAID

## 2025-03-06 ENCOUNTER — OFFICE VISIT (OUTPATIENT)
Dept: ORTHOPEDICS | Facility: CLINIC | Age: 55
End: 2025-03-06
Payer: MEDICAID

## 2025-03-06 DIAGNOSIS — M25.561 CHRONIC PAIN OF RIGHT KNEE: ICD-10-CM

## 2025-03-06 DIAGNOSIS — M17.0 PRIMARY OSTEOARTHRITIS OF BOTH KNEES: Primary | ICD-10-CM

## 2025-03-06 DIAGNOSIS — G89.29 CHRONIC PAIN OF RIGHT KNEE: ICD-10-CM

## 2025-03-06 DIAGNOSIS — G89.29 CHRONIC PAIN OF LEFT KNEE: ICD-10-CM

## 2025-03-06 DIAGNOSIS — M25.562 CHRONIC PAIN OF LEFT KNEE: ICD-10-CM

## 2025-03-06 PROCEDURE — 99999 PR PBB SHADOW E&M-EST. PATIENT-LVL III: CPT | Mod: PBBFAC,,,

## 2025-03-06 PROCEDURE — 20610 DRAIN/INJ JOINT/BURSA W/O US: CPT | Mod: 50,PBBFAC,PN

## 2025-03-06 PROCEDURE — 99999PBSHW PR PBB SHADOW TECHNICAL ONLY FILED TO HB: Mod: PBBFAC,,,

## 2025-03-06 PROCEDURE — 99213 OFFICE O/P EST LOW 20 MIN: CPT | Mod: PBBFAC,PN

## 2025-03-06 RX ORDER — TRIAMCINOLONE ACETONIDE 40 MG/ML
40 INJECTION, SUSPENSION INTRA-ARTICULAR; INTRAMUSCULAR
Status: DISCONTINUED | OUTPATIENT
Start: 2025-03-06 | End: 2025-03-06 | Stop reason: HOSPADM

## 2025-03-06 RX ADMIN — TRIAMCINOLONE ACETONIDE 40 MG: 40 INJECTION, SUSPENSION INTRA-ARTICULAR; INTRAMUSCULAR at 08:03

## 2025-03-06 NOTE — PROCEDURES
Large Joint Aspiration/Injection: bilateral knee    Date/Time: 3/6/2025 8:30 AM    Performed by: Christine Macedo PA-C  Authorized by: Christine Macedo PA-C    Consent Done?:  Yes (Verbal)  Indications:  Arthritis and pain  Site marked: the procedure site was marked    Timeout: prior to procedure the correct patient, procedure, and site was verified      Local anesthesia used?: Yes    Local anesthetic:  Topical anesthetic    Details:  Needle Size:  22 G  Ultrasonic Guidance for needle placement?: No    Approach:  Anterolateral  Location:  Knee  Laterality:  Bilateral  Site:  Bilateral knee  Medications (Right):  40 mg triamcinolone acetonide 40 mg/mL  Medications (Left):  40 mg triamcinolone acetonide 40 mg/mL  Patient tolerance:  Patient tolerated the procedure well with no immediate complications    Injection Procedure Note   Prior to procedure the correct patient, procedure, and site was verified. Allergies were reviewed and verbal consent was obtained. The procedure site was marked.    After time out was performed, the patient was prepped aseptically with chloraprep, the area was sprayed with local topical anesthetic, and then cleaned with alcohol. A therapeutic injection of combination of 2 cc 1% Xylocaine and 40mg Triamcinolone was given under sterile technique using a 22-gauge x 1.5 needle from the anterolateral aspect of the bilateral knee joint. Sterile dressing applied.     Patient tolerated the procedure well. Pain decreased. She had no adverse reactions to the medication.     The patient is cautioned that immediate relief of pain is secondary to the local anesthetic and will be temporary. After the anesthetic wears off there may be a increase in pain that may last for a few hours or a few days. Advised patient to avoid strenuous activities for the next 24-48 hours and to apply ice for 20 minutes to help alleviate this this pain. She was warned of possible blood sugar and/or blood pressure  changes following injection. She was reminded to call the clinic immediately for any adverse side effects as explained in clinic today.

## 2025-03-06 NOTE — PROGRESS NOTES
Subjective:      Patient ID: Clayton Sandoval is a 54 y.o. female.    Chief Complaint: Pain of the Left Knee and Pain of the Right Knee      HPI: Clayton Sandoval is a 54 y.o. female who presents to clinic for follow up of bilateral knee osteoarthritis and pain. Patient was last seen by myself on 10/24/2024 for this and corticosteroid injection was performed.  She reports symptoms returned around 6 weeks ago. She reports that the pain is a 9/10 pain today and a 10/10 at its worst. She denies a history of trauma, but symptoms have been present for multiple years.  The pain is aggravated by walking and standing. Associated symptoms include swelling, giving way, pseudolocking, and gelling.  Patient does report multiple falls due to the knee giving out. Denies numbness, tingling, radiation and inability to bear weight. The pain is affecting ADLs and limiting desired level of activity. Previous treatments include Tylenol, Naproxen, activity modifications, corticosteroid injections, and rest which have provided some temporary relief.     She has received good relief with knee injections in the past (last injection on 10/24/2024) and is requesting repeat cortisone injection today in the bilateral knee.    Of note, patient was previously referred to physical therapy and to Rheumatology for her polyarthralgia.     Patient declined translation service, son at bedside to assist with translating.        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   History of DVT/PE: Negative      PAST MEDICAL HISTORY:    Past Medical History:   Diagnosis Date    Disorder of kidney and ureter     Hypertension     Migraine headache      MEDICATIONS:   Current Outpatient Medications:     metFORMIN (GLUCOPHAGE) 500 MG tablet, Take 500 mg by mouth 2 (two) times daily., Disp: , Rfl:     naproxen (NAPROSYN) 500 MG  tablet, Take 1 tablet (500 mg total) by mouth 2 (two) times daily with meals., Disp: 60 tablet, Rfl: 2    orphenadrine (NORFLEX) 100 mg tablet, Take 1 tablet (100 mg total) by mouth 2 (two) times daily., Disp: 10 tablet, Rfl: 0    amLODIPine (NORVASC) 5 MG tablet, Take 1 tablet (5 mg total) by mouth once daily., Disp: 30 tablet, Rfl: 0    estradiol-norethindrone (ACTIVELLA) 1-0.5 mg per tablet, Take 1 tablet by mouth once daily., Disp: 30 tablet, Rfl: 11    LIDOcaine (LIDODERM) 5 %, Place 1 patch onto the skin once daily. Remove & Discard patch within 12 hours or as directed by MD (Patient not taking: Reported on 10/24/2024), Disp: 6 patch, Rfl: 0    propranoloL (INDERAL LA) 120 MG 24 hr capsule, Take 1 capsule (120 mg total) by mouth once daily., Disp: 30 capsule, Rfl: 0    ALLERGIES:   Review of patient's allergies indicates:   Allergen Reactions    Topiramate        Review of Systems:  Constitution: Negative for chills, fever and night sweats.   HENT: Negative for congestion and headaches.    Eyes: Negative for blurred vision or vision loss.  Cardiovascular: Negative for chest pain and syncope.   Respiratory: Negative for cough and shortness of breath.    Endocrine: Negative for polydipsia, polyphagia and polyuria.   Hematologic/Lymphatic: Negative for bleeding problem. Does not bruise/bleed easily.   Skin: Negative for dry skin, itching and rash.   Musculoskeletal: See HPI.   Gastrointestinal: Negative for abdominal pain and bowel incontinence.   Genitourinary: Negative for bladder incontinence and nocturia.   Neurological: Negative for disturbances in coordination, loss of balance and seizures.   Psychiatric/Behavioral: Negative for depression. The patient does not have insomnia.    Allergic/Immunologic: Negative for hives and persistent infections.          Objective:      There were no vitals filed for this visit.    PHYSICAL EXAM:  General: Alert & oriented x3, well-developed and well-nourished, in no acute  distress, sitting comfortably in the exam room.  Skin: Warm and dry. Capillary refill less than 2 seconds.   Head: Normocephalic and atraumatic.   Eyes: Sclera appear normal.   Nose: No deformities seen.   Ears: No deformities seen.   Neck: No tracheal deviation present.   Pulmonary/Chest: Breathing unlabored.   Neurological: Alert and oriented to person, place, and time.   Psychiatric: Mood is pleasant and affect appropriate.     BILATERAL KNEES        Body habitus is overweight.         The patient walks with a limp.        The skin over the knee is intact. No signs of infection.         Knee effusion:  none         Pulses DP present, PT present.        Motor:  normal 5/5 strength in all tested muscle groups.         Sensory:  normal.    Imaging:   X-Rays: 4 views of bilateral knee dated 09/16/2024, and independently reviewed, show: Degenerative changes bilaterally including medial joint space narrowing and subchondral sclerosis. No acute fractures or dislocations. No evidence of foreign bodies.         Assessment:       1. Primary osteoarthritis of both knees    2. Chronic pain of right knee    3. Chronic pain of left knee        Plan:       Orders Placed This Encounter    Large Joint Aspiration/Injection: bilateral knee       I explained the nature of the problem to the patient.     I discussed at length with the patient all the different treatment options available for her bilateral knees including anti-inflammatories, acetaminophen, bracing, rest, ice, heat, physical therapy to include strengthening exercise, weight loss, corticosteroid injections, viscosupplement injections, and Iovera.     I explained the potential role of knee replacement in the treatment of this condition. I also explained the typical clinical course.  The usual complications that that can occur were also discussed. The patient understands that if non-surgical measures do not adequately control symptoms, surgery will be considered in the  future. The patient agrees to discuss this again at follow-up, if clinically warranted.    Medications:  Continue OTC Tylenol Arthritis and previously prescribed Naproxen 500 mg BID as needed for pain management.   Physical Therapy:  Continue with previously ordered physical therapy for knee ROM, stretching, strengthening, and conditioning.  Procedures:  Corticosteroid injection requested and performed today to the bilateral knee. See procedure note. Standard precautions provided.  Pain Management: Ice compress to the affected area 2-3x a day for 15-20 minutes as needed for pain management.    Follow-Up: 3 months for follow-up and repeat corticosteroid injections.    All of the patient's questions were answered and the patient will contact us if they have any questions or concerns in the interim.    Christine Macedo PA-C  [facility]  Orthopedic Surgery    Medical Dictation software was used during the dictation of portions or the entirety of this medical record.  Phonetic or grammatic errors may exist due to the use of this software. For clarification, refer to the author of the document.

## 2025-03-10 PROCEDURE — 99284 EMERGENCY DEPT VISIT MOD MDM: CPT

## 2025-03-11 ENCOUNTER — HOSPITAL ENCOUNTER (EMERGENCY)
Facility: HOSPITAL | Age: 55
Discharge: HOME | End: 2025-03-11
Attending: STUDENT IN AN ORGANIZED HEALTH CARE EDUCATION/TRAINING PROGRAM
Payer: MEDICAID

## 2025-03-11 VITALS
TEMPERATURE: 99 F | BODY MASS INDEX: 35.44 KG/M2 | HEIGHT: 63 IN | OXYGEN SATURATION: 99 % | WEIGHT: 200 LBS | RESPIRATION RATE: 20 BRPM | DIASTOLIC BLOOD PRESSURE: 89 MMHG | SYSTOLIC BLOOD PRESSURE: 132 MMHG | HEART RATE: 79 BPM

## 2025-03-11 DIAGNOSIS — F17.200 TOBACCO DEPENDENCE: ICD-10-CM

## 2025-03-11 DIAGNOSIS — J40 BRONCHITIS: Primary | ICD-10-CM

## 2025-03-11 DIAGNOSIS — G89.29 CHRONIC PAIN OF LEFT KNEE: ICD-10-CM

## 2025-03-11 DIAGNOSIS — M25.562 CHRONIC PAIN OF LEFT KNEE: ICD-10-CM

## 2025-03-11 DIAGNOSIS — M17.0 PRIMARY OSTEOARTHRITIS OF BOTH KNEES: ICD-10-CM

## 2025-03-11 DIAGNOSIS — R05.9 COUGH: ICD-10-CM

## 2025-03-11 DIAGNOSIS — R09.1 PLEURISY: ICD-10-CM

## 2025-03-11 DIAGNOSIS — M25.561 CHRONIC PAIN OF RIGHT KNEE: ICD-10-CM

## 2025-03-11 DIAGNOSIS — G89.29 CHRONIC PAIN OF RIGHT KNEE: ICD-10-CM

## 2025-03-11 LAB
INFLUENZA A, MOLECULAR: NEGATIVE
INFLUENZA B, MOLECULAR: NEGATIVE
OHS QRS DURATION: 72 MS
OHS QTC CALCULATION: 438 MS
SARS-COV-2 RDRP RESP QL NAA+PROBE: NEGATIVE
SPECIMEN SOURCE: NORMAL

## 2025-03-11 PROCEDURE — 96372 THER/PROPH/DIAG INJ SC/IM: CPT | Performed by: STUDENT IN AN ORGANIZED HEALTH CARE EDUCATION/TRAINING PROGRAM

## 2025-03-11 PROCEDURE — 93010 ELECTROCARDIOGRAM REPORT: CPT | Mod: ,,, | Performed by: STUDENT IN AN ORGANIZED HEALTH CARE EDUCATION/TRAINING PROGRAM

## 2025-03-11 PROCEDURE — 93005 ELECTROCARDIOGRAM TRACING: CPT

## 2025-03-11 PROCEDURE — 87635 SARS-COV-2 COVID-19 AMP PRB: CPT | Performed by: STUDENT IN AN ORGANIZED HEALTH CARE EDUCATION/TRAINING PROGRAM

## 2025-03-11 PROCEDURE — 63600175 PHARM REV CODE 636 W HCPCS: Mod: JZ,TB | Performed by: STUDENT IN AN ORGANIZED HEALTH CARE EDUCATION/TRAINING PROGRAM

## 2025-03-11 PROCEDURE — 87502 INFLUENZA DNA AMP PROBE: CPT | Performed by: STUDENT IN AN ORGANIZED HEALTH CARE EDUCATION/TRAINING PROGRAM

## 2025-03-11 PROCEDURE — 25000003 PHARM REV CODE 250: Performed by: STUDENT IN AN ORGANIZED HEALTH CARE EDUCATION/TRAINING PROGRAM

## 2025-03-11 RX ORDER — NAPROXEN 500 MG/1
500 TABLET ORAL 2 TIMES DAILY WITH MEALS
Qty: 60 TABLET | Refills: 2 | Status: SHIPPED | OUTPATIENT
Start: 2025-03-11

## 2025-03-11 RX ORDER — LIDOCAINE 50 MG/G
1 PATCH TOPICAL DAILY
Qty: 14 PATCH | Refills: 0 | Status: SHIPPED | OUTPATIENT
Start: 2025-03-11

## 2025-03-11 RX ORDER — KETOROLAC TROMETHAMINE 30 MG/ML
30 INJECTION, SOLUTION INTRAMUSCULAR; INTRAVENOUS
Status: COMPLETED | OUTPATIENT
Start: 2025-03-11 | End: 2025-03-11

## 2025-03-11 RX ORDER — ALBUTEROL SULFATE 90 UG/1
2 INHALANT RESPIRATORY (INHALATION) EVERY 6 HOURS PRN
Qty: 6.7 G | Refills: 0 | Status: SHIPPED | OUTPATIENT
Start: 2025-03-11 | End: 2026-03-11

## 2025-03-11 RX ORDER — LIDOCAINE 50 MG/G
1 PATCH TOPICAL
Status: DISCONTINUED | OUTPATIENT
Start: 2025-03-11 | End: 2025-03-11 | Stop reason: HOSPADM

## 2025-03-11 RX ADMIN — LIDOCAINE 1 PATCH: 50 PATCH CUTANEOUS at 03:03

## 2025-03-11 RX ADMIN — KETOROLAC TROMETHAMINE 30 MG: 30 INJECTION, SOLUTION INTRAMUSCULAR at 03:03

## 2025-03-11 NOTE — Clinical Note
"Clayton"Jose A Sandoval was seen and treated in our emergency department on 3/10/2025.  She may return to work on 03/13/2025.       If you have any questions or concerns, please don't hesitate to call.      Debbie Munoz, DO"

## 2025-03-12 NOTE — ED PROVIDER NOTES
NAME:  Clayton Sandoval  CSN:     [id number]  MRN:    [id number]  ADMIT DATE: 3/10/2025        eMERGENCY dEPARTMENT eNCOUnter    CHIEF COMPLAINT    Chief Complaint   Patient presents with    Chest Pain     Patient reports right rib pain for several days due to recent harsh coughing. She states that the pain is worse when she lays on that side. She has been having the cough for a few weeks after being sick. Denies dyspnea, fever.        HPI    Clayton Sandoval is a 54 y.o. female with a past medical history of  has a past medical history of Disorder of kidney and ureter, Hypertension, and Migraine headache.     Agree with triage note.  She is also a daily smoker.    HPI       PAST MEDICAL HISTORY  Past Medical History:   Diagnosis Date    Disorder of kidney and ureter     Hypertension     Migraine headache        SURGICAL HISTORY    Past Surgical History:   Procedure Laterality Date     SECTION      REDUCTION OF BOTH BREASTS Bilateral     TOTAL REDUCTION MAMMOPLASTY Bilateral     TUBAL LIGATION         FAMILY HISTORY    Family History   Family history unknown: Yes       SOCIAL HISTORY    Social History     Socioeconomic History    Marital status:    Tobacco Use    Smoking status: Every Day     Current packs/day: 0.35     Types: Cigarettes    Smokeless tobacco: Never   Substance and Sexual Activity    Alcohol use: No    Drug use: No    Sexual activity: Yes     Social Drivers of Health     Physical Activity: Unknown (2024)    Received from [facility]    Exercise Vital Sign     Days of Exercise per Week: Patient declined       MEDICATIONS  Current Outpatient Medications   Medication Instructions    albuterol (PROVENTIL/VENTOLIN HFA) 90 mcg/actuation inhaler 2 puffs, Inhalation, Every 6 hours PRN, Rescue    amLODIPine (NORVASC) 5 mg, Oral, Daily    estradiol-norethindrone (ACTIVELLA) 1-0.5 mg per tablet 1 tablet, Oral, Daily    LIDOcaine (LIDODERM) 5 % 1 patch, Transdermal, Daily, Remove & Discard patch  "within 12 hours or as directed by MD    metFORMIN (GLUCOPHAGE) 500 mg, 2 times daily    naproxen (NAPROSYN) 500 mg, Oral, 2 times daily with meals    orphenadrine (NORFLEX) 100 mg, Oral, 2 times daily    propranoloL (INDERAL LA) 120 mg, Oral, Daily       ALLERGIES    Review of patient's allergies indicates:   Allergen Reactions    Topiramate          REVIEW OF SYSTEMS   Review of Systems       PHYSICAL EXAM    Reviewed Triage Note    VITAL SIGNS:   ED Triage Vitals [03/10/25 2313]   Encounter Vitals Group      BP (!) 147/85      Systolic BP Percentile       Diastolic BP Percentile       Pulse 88      Resp 18      Temp 98.8 °F (37.1 °C)      Temp Source Oral      SpO2 97 %      Weight 200 lb      Height 5' 3"      Head Circumference       Peak Flow       Pain Score       Pain Loc       Pain Education       Exclude from Growth Chart        No data found.    Physical Exam    Nursing note and vitals reviewed.  Constitutional: She appears well-developed and well-nourished.   HENT:   Head: Normocephalic and atraumatic.   Eyes: EOM are normal. Pupils are equal, round, and reactive to light.   Neck: Neck supple.   Normal range of motion.  Cardiovascular:  Normal rate and regular rhythm.           Pulmonary/Chest: No respiratory distress. She has wheezes (trace). She exhibits tenderness (R anteriolateral).   Abdominal: Abdomen is soft. There is no abdominal tenderness.   Musculoskeletal:         General: Normal range of motion.      Cervical back: Normal range of motion and neck supple.     Neurological: She is alert and oriented to person, place, and time.   Skin: Skin is warm and dry.   Psychiatric: She has a normal mood and affect.            EKG     Interpreted by EM physician if performed:   Normal sinus rhythm. No ST elevation or depression.  No T-wave inversions.  No evidence of ischemia. Rate 72      ECG Results              EKG 12-lead (Final result)        Collection Time Result Time QRS Duration OHS QTC " Calculation    03/11/25 02:34:29 03/11/25 16:33:07 72 438                     Final result by Interface, [facility] (03/11/25 16:33:16)                   Narrative:    Test Reason : R05.9,    Vent. Rate :  79 BPM     Atrial Rate :  79 BPM     P-R Int : 142 ms          QRS Dur :  72 ms      QT Int : 382 ms       P-R-T Axes :  60  38  47 degrees    QTcB Int : 438 ms    Normal sinus rhythm  Normal ECG  No previous ECGs available  Confirmed by Taran Shipley ([id number]) on 3/11/2025 4:33:03 PM    Referred By: AAAREFERRAL SELF           Confirmed By: Taran Bueno                                      LABS  Pertinent labs reviewed. (See chart for details)   Labs Reviewed   INFLUENZA A & B BY MOLECULAR       Result Value    Influenza A, Molecular Negative      Influenza B, Molecular Negative      Flu A & B Source NP     SARS-COV-2 RNA AMPLIFICATION, QUAL    SARS-CoV-2 RNA, Amplification, Qual Negative           RADIOLOGY          Imaging Results              X-Ray Chest PA And Lateral (Final result)  Result time 03/11/25 00:38:33      Final result by Len Franco DO (03/11/25 00:38:33)                   Impression:      No acute abnormality.      Electronically signed by: Len Franco  Date:    03/11/2025  Time:    00:38               Narrative:    EXAMINATION:  XR CHEST PA AND LATERAL    CLINICAL HISTORY:  Cough, unspecified    TECHNIQUE:  PA and lateral views of the chest were performed.    COMPARISON:  None    FINDINGS:  The lungs are well expanded and clear. No focal opacities are seen. The pleural spaces are clear. The cardiac silhouette is unremarkable.  There are calcifications of the aortic arch.  The visualized osseous structures are unremarkable.                                        PROCEDURES    Procedures      ED COURSE & MEDICAL DECISION MAKING    Pertinent Labs & Imaging studies reviewed. (See chart for details and specific orders.)          Summary of review of records:       Medical  Decision Making  Amount and/or Complexity of Data Reviewed  Labs:  Decision-making details documented in ED Course.  Radiology:  Decision-making details documented in ED Course.  ECG/medicine tests:  Decision-making details documented in ED Course.    Risk  Prescription drug management.      Clayton Sandoval is a 54 y.o. female presents for ongoing right-sided chest pain worse with movement and deep breathing in the setting of recent illness last week.  She is also a daily smoker.    Differential includes but is not limited to pneumonia, costochondritis, clinically low suspicion for cardiac etiology core pulmonary embolism as she is overall low risk..          Medications   ketorolac injection 30 mg (30 mg Intramuscular Given 3/11/25 0319)       ED Course as of 03/12/25 0740   Tue Mar 11, 2025   0141 Influenza A, Molecular: Negative [HL]   0141 Influenza B, Molecular: Negative [HL]   0141 SARS-CoV-2 RNA, Amplification, Qual: Negative [HL]   0141 X-Ray Chest PA And Lateral     No acute abnormality.   [HL]   0239 EKG 12-lead  Normal sinus rhythm. No ST elevation or depression.  No T-wave inversions.  No evidence of ischemia. Rate 79.  There is some baseline artifact. [HL]      ED Course User Index  [HL] Debbie Munoz., DO       No acute emergent medical condition has been identified. The patient appears to be low risk for an emergent medical condition is appropriate for discharge with outpatient f/u as detailed in discharge instructions for reevaluation and possible continued outpatient workup and management. I have discussed the workup with the patient, who has verbalized understanding of the plan and need for outpatient follow-up.  This evaluation does not preclude the development of an emergent condition so in addition, I have advised the patient that they can return to the ED at any time with worsening or change of their symptoms, or with any other medical complaint.         FINAL IMPRESSION    Final  diagnoses:  [R05.9] Cough  [J40] Bronchitis (Primary)  [R09.1] Pleurisy  [F17.200] Tobacco dependence       DISPOSITION  Patient discharged in stable condition        ED Prescriptions       Medication Sig Dispense Start Date End Date Auth. Provider    naproxen (NAPROSYN) 500 MG tablet Take 1 tablet (500 mg total) by mouth 2 (two) times daily with meals. 60 tablet 3/11/2025 -- Debbie Munzo DO    LIDOcaine (LIDODERM) 5 % Place 1 patch onto the skin once daily. Remove & Discard patch within 12 hours or as directed by MD 14 patch 3/11/2025 -- Debbie Munoz DO    albuterol (PROVENTIL/VENTOLIN HFA) 90 mcg/actuation inhaler Inhale 2 puffs into the lungs every 6 (six) hours as needed for Wheezing or Shortness of Breath. Rescue 6.7 g 3/11/2025 3/11/2026 Debbie Munzo DO          Follow-up Information       Follow up With Specialties Details Why Contact Info    Farhat Justin MD Family Medicine Schedule an appointment as soon as possible for a visit in 1 week  [address] [address] [phone]      [place] - Emergency Dept Emergency Medicine  As needed, If symptoms worsen [address]  [facility] [address] [phone]              DISCLAIMER: This note was prepared with [employer] voice recognition transcription software. Garbled syntax, mangled pronouns, and other bizarre constructions may be attributed to that software system.             Debbie Munoz DO  03/12/25 0743

## 2025-03-13 DIAGNOSIS — K76.0 FATTY (CHANGE OF) LIVER, NOT ELSEWHERE CLASSIFIED: Primary | ICD-10-CM

## 2025-03-17 DIAGNOSIS — K76.0 FATTY LIVER: Primary | ICD-10-CM

## 2025-04-16 ENCOUNTER — TELEPHONE (OUTPATIENT)
Dept: HEPATOLOGY | Facility: CLINIC | Age: 55
End: 2025-04-16
Payer: MEDICAID

## 2025-04-16 NOTE — TELEPHONE ENCOUNTER
Spoke with patient. She said she had US per PCP and it showed fatty liver.  Patient called PCP and ask to fax US report to Hepatology.     requested by Diann.

## 2025-04-16 NOTE — TELEPHONE ENCOUNTER
Call returned to the patient. Pt scheduled her appt 6/20/2025 virtual visit with Po horvath.  No sooner appt as of today.  Pt is listed on waiting list for sooner appt.        RE: Hepatology Referral  Received: Yesterday  Joyce Martinez Latasha; [facility] Hepatology Scheduling  If pt already scheduled, this would go to the scheduling department. I only work new referrals that are not scheduled yet.          Previous Messages       ----- Message -----  From: Ancelmo Morrow  Sent: 4/1/2025   9:34 AM CDT  To: [facility] Liver Referral Pool  Subject: Hepatology Referral                              .Good morning,     Current patient is being referred to Hepatology from Wero Seaman NP for Fatty liver [K76.0]. Patient has a Virtual Visit scheduled 6/20/25, but would like to get in sooner. The referral and records are scanned in to media mgr. Please contact pt to schedule and let me know if I can help any further.     Thank you,    Ancelmo DICK  Clinic   Fax: [phone]

## 2025-04-22 ENCOUNTER — TELEPHONE (OUTPATIENT)
Dept: ENDOSCOPY | Facility: HOSPITAL | Age: 55
End: 2025-04-22
Payer: MEDICAID

## 2025-04-22 VITALS — WEIGHT: 200 LBS | BODY MASS INDEX: 35.43 KG/M2

## 2025-04-22 DIAGNOSIS — R10.9 ABDOMINAL PAIN, UNSPECIFIED ABDOMINAL LOCATION: ICD-10-CM

## 2025-04-22 DIAGNOSIS — Z12.11 SPECIAL SCREENING FOR MALIGNANT NEOPLASMS, COLON: ICD-10-CM

## 2025-04-22 DIAGNOSIS — C16.9 MALIGNANT NEOPLASM OF STOMACH, UNSPECIFIED LOCATION: Primary | ICD-10-CM

## 2025-04-22 RX ORDER — METHOCARBAMOL 500 MG/1
500 TABLET, FILM COATED ORAL 4 TIMES DAILY
COMMUNITY

## 2025-04-22 NOTE — TELEPHONE ENCOUNTER
Referral for procedure from [facility]      Spoke to patient to schedule procedure(s) Colonoscopy       Physician to perform procedure(s) Dr. KESHIA Mendieta  Date of Procedure (s) 05/16/25  Arrival Time 07:45 AM  Time of Procedure(s) 08:45 AM   Location of Procedure(s) [place] 2nd Floor  Type of Rx Prep sent to patient: PEG  Instructions provided to patient via Email    Patient was informed on the following information and verbalized understanding. Screening questionnaire reviewed with patient and complete. If procedure requires anesthesia, a responsible adult needs to be present to accompany the patient home, patient cannot drive after receiving anesthesia. Appointment details are tentative, especially check-in time. Patient will receive a prep-op call 7 days prior to confirm check-in time for procedure. If applicable the patient should contact their pharmacy to verify Rx for procedure prep is ready for pick-up. Patient was advised to call the scheduling department at [phone] if pharmacy states no Rx is available. Patient was advised to call the endoscopy scheduling department if any questions or concerns arise.      [facility]oscopy Scheduling Department

## 2025-04-24 DIAGNOSIS — M79.671 PAIN IN RIGHT FOOT: Primary | ICD-10-CM

## 2025-05-14 ENCOUNTER — TELEPHONE (OUTPATIENT)
Dept: ENDOSCOPY | Facility: HOSPITAL | Age: 55
End: 2025-05-14
Payer: MEDICAID

## 2025-05-14 ENCOUNTER — TELEPHONE (OUTPATIENT)
Dept: UROLOGY | Facility: CLINIC | Age: 55
End: 2025-05-14
Payer: MEDICAID

## 2025-05-14 NOTE — TELEPHONE ENCOUNTER
----- Message from Delaney sent at 5/14/2025  1:36 PM CDT -----  Type:  CallWho Called:[facility] RXDoes the patient know what this is regarding?:Colonoscopy PrepWould the patient rather a call back or a response via [facility]? [facility]:[phone] Information:

## 2025-05-14 NOTE — TELEPHONE ENCOUNTER
Called pt to verify date and arrival time of procedure. Spoke to pt via . Explained instructions to the pt and she states that she understands no blood thinners or weight loss medications the patient confirmed.

## 2025-05-16 ENCOUNTER — TELEPHONE (OUTPATIENT)
Dept: ENDOSCOPY | Facility: HOSPITAL | Age: 55
End: 2025-05-16
Payer: MEDICAID

## 2025-05-16 DIAGNOSIS — Z12.11 COLON CANCER SCREENING: Primary | ICD-10-CM

## 2025-05-16 RX ORDER — POLYETHYLENE GLYCOL 3350, SODIUM SULFATE ANHYDROUS, SODIUM BICARBONATE, SODIUM CHLORIDE, POTASSIUM CHLORIDE 236; 22.74; 6.74; 5.86; 2.97 G/4L; G/4L; G/4L; G/4L; G/4L
4 POWDER, FOR SOLUTION ORAL ONCE
Qty: 4000 ML | Refills: 0 | Status: SHIPPED | OUTPATIENT
Start: 2025-05-16 | End: 2025-05-16

## 2025-06-17 ENCOUNTER — TELEPHONE (OUTPATIENT)
Dept: ENDOSCOPY | Facility: HOSPITAL | Age: 55
End: 2025-06-17
Payer: MEDICAID

## 2025-06-17 NOTE — TELEPHONE ENCOUNTER
Spoke w/patient via  #[phone] and confirmed procedure appt for 6/19/25 and arrival time of 0915.  All questions answered.

## 2025-06-19 ENCOUNTER — ANESTHESIA EVENT (OUTPATIENT)
Dept: ENDOSCOPY | Facility: HOSPITAL | Age: 55
End: 2025-06-19
Payer: MEDICAID

## 2025-06-19 ENCOUNTER — ANESTHESIA (OUTPATIENT)
Dept: ENDOSCOPY | Facility: HOSPITAL | Age: 55
End: 2025-06-19
Payer: MEDICAID

## 2025-06-19 ENCOUNTER — HOSPITAL ENCOUNTER (OUTPATIENT)
Facility: HOSPITAL | Age: 55
Discharge: HOME | End: 2025-06-19
Attending: STUDENT IN AN ORGANIZED HEALTH CARE EDUCATION/TRAINING PROGRAM | Admitting: STUDENT IN AN ORGANIZED HEALTH CARE EDUCATION/TRAINING PROGRAM
Payer: MEDICAID

## 2025-06-19 VITALS
SYSTOLIC BLOOD PRESSURE: 140 MMHG | TEMPERATURE: 98 F | BODY MASS INDEX: 35.51 KG/M2 | HEIGHT: 64 IN | RESPIRATION RATE: 25 BRPM | WEIGHT: 208 LBS | DIASTOLIC BLOOD PRESSURE: 85 MMHG | OXYGEN SATURATION: 92 % | HEART RATE: 80 BPM

## 2025-06-19 DIAGNOSIS — Z12.11 COLON CANCER SCREENING: Primary | ICD-10-CM

## 2025-06-19 DIAGNOSIS — R10.9 ABDOMINAL PAIN: ICD-10-CM

## 2025-06-19 LAB — POCT GLUCOSE: 110 MG/DL (ref 70–110)

## 2025-06-19 PROCEDURE — 25000003 PHARM REV CODE 250: Performed by: NURSE ANESTHETIST, CERTIFIED REGISTERED

## 2025-06-19 PROCEDURE — 63600175 PHARM REV CODE 636 W HCPCS: Performed by: NURSE ANESTHETIST, CERTIFIED REGISTERED

## 2025-06-19 PROCEDURE — 37000009 HC ANESTHESIA EA ADD 15 MINS: Performed by: STUDENT IN AN ORGANIZED HEALTH CARE EDUCATION/TRAINING PROGRAM

## 2025-06-19 PROCEDURE — 45378 DIAGNOSTIC COLONOSCOPY: CPT | Performed by: STUDENT IN AN ORGANIZED HEALTH CARE EDUCATION/TRAINING PROGRAM

## 2025-06-19 PROCEDURE — 37000008 HC ANESTHESIA 1ST 15 MINUTES: Performed by: STUDENT IN AN ORGANIZED HEALTH CARE EDUCATION/TRAINING PROGRAM

## 2025-06-19 RX ORDER — LIDOCAINE HYDROCHLORIDE 20 MG/ML
INJECTION INTRAVENOUS
Status: DISCONTINUED | OUTPATIENT
Start: 2025-06-19 | End: 2025-06-19

## 2025-06-19 RX ORDER — SODIUM CHLORIDE 9 MG/ML
INJECTION, SOLUTION INTRAVENOUS CONTINUOUS
Status: DISCONTINUED | OUTPATIENT
Start: 2025-06-19 | End: 2025-06-19 | Stop reason: HOSPADM

## 2025-06-19 RX ORDER — PROPOFOL 10 MG/ML
VIAL (ML) INTRAVENOUS CONTINUOUS PRN
Status: DISCONTINUED | OUTPATIENT
Start: 2025-06-19 | End: 2025-06-19

## 2025-06-19 RX ADMIN — SODIUM CHLORIDE: 0.9 INJECTION, SOLUTION INTRAVENOUS at 10:06

## 2025-06-19 RX ADMIN — PROPOFOL 50 MG: 10 INJECTION, EMULSION INTRAVENOUS at 10:06

## 2025-06-19 RX ADMIN — PROPOFOL 150 MCG/KG/MIN: 10 INJECTION, EMULSION INTRAVENOUS at 10:06

## 2025-06-19 RX ADMIN — LIDOCAINE HYDROCHLORIDE 100 MG: 20 INJECTION, SOLUTION INTRAVENOUS at 10:06

## 2025-06-19 NOTE — ANESTHESIA PREPROCEDURE EVALUATION
[facility]  Anesthesia Pre-Operative Evaluation         Patient Name: Clayton Sandoval  YOB: 1970  MRN: [id number]    SUBJECTIVE:     Pre-operative evaluation for Procedure(s) (LRB):  COLONOSCOPY (N/A)     2025    Clayton Sandoval is a 54 y.o. female w/ a significant PMHx of HTN, DM and daily smoking.  She smoked this morning prior to her procedure.    Patient now presents for the above procedure(s).    TTE:   No echocardiogram results found for the past 12 months     Problem List[1]    Review of patient's allergies indicates:   Allergen Reactions    Topiramate        Current Inpatient Medications:      Medications Ordered Prior to Encounter[2]    Past Surgical History:   Procedure Laterality Date     SECTION      GALLBLADDER SURGERY      REDUCTION OF BOTH BREASTS Bilateral     TOTAL REDUCTION MAMMOPLASTY Bilateral     TUBAL LIGATION         OBJECTIVE:     Vital Signs Range (Last 24H):  Temp:  [36.5 °C (97.7 °F)]   Pulse:  [79]   Resp:  [17]   BP: (133)/(87)   SpO2:  [96 %]       Significant Labs:  Lab Results   Component Value Date    WBC 11.68 2022    HGB 15.8 2022    HCT 48.2 2022     2022    CHOL 198 2022    TRIG 223 (H) 2022    HDL 42 2022    ALT 14 2022    AST 15 2022     2022    K 4.1 2022     2022    CREATININE 0.6 2022    BUN 9 2022    CO2 25 2022    HGBA1C 5.6 2022       Diagnostic Studies: No relevant studies.    EKG:   Results for orders placed or performed during the hospital encounter of 25   EKG 12-lead    Collection Time: 25  2:34 AM   Result Value Ref Range    QRS Duration 72 ms    OHS QTC Calculation 438 ms    Narrative    Test Reason : R05.9,    Vent. Rate :  79 BPM     Atrial Rate :  79 BPM     P-R Int : 142 ms          QRS Dur :  72 ms      QT Int : 382 ms       P-R-T Axes :  60  38  47 degrees    QTcB Int : 438  ms    Normal sinus rhythm  Normal ECG  No previous ECGs available  Confirmed by Taran Shipley ([id number]) on 3/11/2025 4:33:03 PM    Referred By: AAAREFERRAL SELF           Confirmed By: Taran Bueno       ASSESSMENT/PLAN:       Pre-op Assessment    I have reviewed the Patient Summary Reports.     I have reviewed the Nursing Notes. I have reviewed the NPO Status.   I have reviewed the Medications.     Review of Systems  Anesthesia Hx:  No problems with previous Anesthesia               Denies Personal Hx of Anesthesia complications.                    Social:  Smoker       Hematology/Oncology:       -- Denies Anemia:                  Denies Current/Recent Cancer                Cardiovascular:  Exercise tolerance: good   Hypertension   Denies MI.  Denies CAD.     Denies Dysrhythmias.    Denies CHF.    no hyperlipidemia   ECG has been reviewed.    Functional Capacity good / => 4 METS                   Hypertension         Pulmonary:    Denies COPD.  Denies Asthma.   Denies Shortness of breath.   Denies Sleep Apnea.                Renal/:  Chronic Renal Disease        Kidney Function/Disease             Hepatic/GI:      Denies GERD.                Musculoskeletal:  Musculoskeletal Normal                Neurological:  Denies TIA.  Denies CVA.   Headaches Denies Seizures.     Dx of Headaches                           Endocrine:  Endocrine Normal                Physical Exam  General: Well nourished, Cooperative, Alert and Oriented    Airway:  Mallampati: II   Mouth Opening: Normal  TM Distance: Normal  Tongue: Normal  Neck ROM: Normal ROM    Dental:  Intact        Anesthesia Plan  Type of Anesthesia, risks & benefits discussed:    Anesthesia Type: Gen Natural Airway  Intra-op Monitoring Plan: Standard ASA Monitors  Post Op Pain Control Plan: multimodal analgesia  Induction:  IV  Informed Consent: Informed consent signed with the Patient and all parties understand the risks and agree with anesthesia plan.  All  questions answered.   ASA Score: 3  Day of Surgery Review of History & Physical: H&P Update referred to the surgeon/provider.    Ready For Surgery From Anesthesia Perspective.     .           [1] There is no problem list on file for this patient.  [2]   No current facility-administered medications on file prior to encounter.     Current Outpatient Medications on File Prior to Encounter   Medication Sig Dispense Refill    amLODIPine (NORVASC) 5 MG tablet Take 1 tablet (5 mg total) by mouth once daily. 30 tablet 0    metFORMIN (GLUCOPHAGE) 500 MG tablet Take 500 mg by mouth 2 (two) times daily.      naproxen (NAPROSYN) 500 MG tablet Take 1 tablet (500 mg total) by mouth 2 (two) times daily with meals. 60 tablet 2    propranoloL (INDERAL LA) 120 MG 24 hr capsule Take 1 capsule (120 mg total) by mouth once daily. 30 capsule 0    albuterol (PROVENTIL/VENTOLIN HFA) 90 mcg/actuation inhaler Inhale 2 puffs into the lungs every 6 (six) hours as needed for Wheezing or Shortness of Breath. Rescue 6.7 g 0    estradiol-norethindrone (ACTIVELLA) 1-0.5 mg per tablet Take 1 tablet by mouth once daily. 30 tablet 11    LIDOcaine (LIDODERM) 5 % Place 1 patch onto the skin once daily. Remove & Discard patch within 12 hours or as directed by MD Bearden patch 0    methocarbamoL (ROBAXIN) 500 MG Tab Take 500 mg by mouth 4 (four) times daily.      orphenadrine (NORFLEX) 100 mg tablet Take 1 tablet (100 mg total) by mouth 2 (two) times daily. 10 tablet 0

## 2025-06-19 NOTE — [employer] PATIENT INSTRUCTIONS
Discharge Summary/Instructions after an Endoscopic Procedure  Patient Name: Clayton Sandoval  Patient MRN: [id number]  Patient YOB: 1970 Thursday, June 19, 2025  Eldon Everett MD  Dear patient,  As a result of recent federal legislation (The Federal Cures Act), you may   receive lab or pathology results from your procedure in your [facility]   account before your physician is able to contact you. Your physician or   their representative will relay the results to you with their   recommendations at their soonest availability.  Thank you,  Your health is very important to us during the Covid Crisis. Following your   procedure today, you will receive a daily text for 2 weeks asking about   signs or symptoms of Covid 19.  Please respond to this text when you   receive it so we can follow up and keep you as safe as possible.   RESTRICTIONS:  During your procedure today, you received medications for sedation.  These   medications may affect your judgment, balance and coordination.  Therefore,   for 24 hours, you have the following restrictions:   - DO NOT drive a car, operate machinery, make legal/financial decisions,   sign important papers or drink alcohol.    ACTIVITY:  Today: no heavy lifting, straining or running due to procedural   sedation/anesthesia.  The following day: return to full activity including work.  DIET:  Eat and drink normally unless instructed otherwise.     TREATMENT FOR COMMON SIDE EFFECTS:  - Mild abdominal pain, nausea, belching, bloating or excessive gas:  rest,   eat lightly and use a heating pad.  - Sore Throat: treat with throat lozenges and/or gargle with warm salt   water.  - Because air was used during the procedure, expelling large amounts of air   from your rectum or belching is normal.  - If a bowel prep was taken, you may not have a bowel movement for 1-3 days.    This is normal.  SYMPTOMS TO WATCH FOR AND REPORT TO YOUR PHYSICIAN:  1. Abdominal pain or bloating, other than  gas cramps.  2. Chest pain.  3. Back pain.  4. Signs of infection such as: chills or fever occurring within 24 hours   after the procedure.  5. Rectal bleeding, which would show as bright red, maroon, or black stools.   (A tablespoon of blood from the rectum is not serious, especially if   hemorrhoids are present.)  6. Vomiting.  7. Weakness or dizziness.  GO DIRECTLY TO THE NEAREST EMERGENCY ROOM IF YOU HAVE ANY OF THE FOLLOWING:      Difficulty breathing              Chills and/or fever over 101 F   Persistent vomiting and/or vomiting blood   Severe abdominal pain   Severe chest pain   Black, tarry stools   Bleeding- more than one tablespoon   Any other symptom or condition that you feel may need urgent attention  Your doctor recommends these additional instructions:  If any biopsies were taken, your doctors clinic will contact you in 1 to 2   weeks with any results.  - Discharge patient to home.   - Resume previous diet.   - Continue present medications.   - Await pathology results.   - Repeat colonoscopy in 1 year with adequate and alternative prep (such as   Suprep), as Golytely prep was suboptimal for complete screening purposes   today.  - Return to primary care physician.  For questions, problems or results please call your physician - Eldon Everett MD.  EMERGENCY PHONE NUMBER: [phone],  LAB RESULTS: [phone]  IF A COMPLICATION OR EMERGENCY SITUATION ARISES AND YOU ARE UNABLE TO REACH   YOUR PHYSICIAN - GO DIRECTLY TO THE EMERGENCY ROOM.  Eldon Everett MD  6/19/2025 11:46:01 AM  This report has been verified and signed electronically.  Dear patient,  As a result of recent federal legislation (The Federal Cures Act), you may   receive lab or pathology results from your procedure in your [facility]   account before your physician is able to contact you. Your physician or   their representative will relay the results to you with their   recommendations at their soonest availability.  Thank  you,  [employer]

## 2025-06-19 NOTE — ANESTHESIA POSTPROCEDURE EVALUATION
Anesthesia Post Evaluation    Patient: Clayton Sandoval    Procedure(s) Performed: Procedure(s) (LRB):  COLONOSCOPY (N/A)    Final Anesthesia Type: general      Patient location during evaluation: GI PACU  Patient participation: Yes- Able to Participate  Level of consciousness: awake and alert and oriented  Post-procedure vital signs: reviewed and stable  Pain management: adequate  Airway patency: patent    PONV status at discharge: No PONV  Anesthetic complications: no      Cardiovascular status: hemodynamically stable  Respiratory status: spontaneous ventilation and unassisted  Hydration status: euvolemic  Follow-up not needed.              Vitals Value Taken Time   /85 06/19/25 11:12   Temp 36.7 °C (98.1 °F) 06/19/25 11:12   Pulse 80 06/19/25 11:12   Resp 25 06/19/25 11:12   SpO2 92 % 06/19/25 11:12         No case tracking events are documented in the log.      Pain/Dayo Score: Dayo Score: 9 (6/19/2025 11:12 AM)

## 2025-06-19 NOTE — PLAN OF CARE
Assessment and Plan  Sore throat  -Negative for flu and COVID  -Positive for strep  -Advised to stop decongestant.  She may do salt water gargles, Tylenol, ibuprofen.  -Advised on changing toothbrush  -Started penicillin  - POCT RAPID STREP A  - POCT INFLUENZA A/B  - POCT SARS-COV-2 ANTIGEN    Elevated blood pressure reading  Tachycardia  -Previously normal blood pressure and heart rate  -Suspect related to decongestant, advised patient to stop  -She may use regular Tylenol and ibuprofen        Follow up appointment: As needed   Provided d/c instructions to pt/son, understanding verbalized.

## 2025-06-19 NOTE — H&P
[facility]    CC: screening    HPI 54 y.o. female with history of HTN, hepatic steatosis, here for CRC screening. Negative Cologuard in 2023. No prior colonoscopy. Denies family hx of colon cancer.    Past Medical History  Past Medical History:   Diagnosis Date    Disorder of kidney and ureter     Hypertension     Migraine headache          Physical Examination  LMP 12/20/2020 (Exact Date)   Breastfeeding No   General appearance: alert, cooperative, no distress  Lungs: clear to auscultation bilaterally, no dullness to percussion bilaterally  Heart: regular rate and rhythm without rub; no displacement of the PMI   Abdomen: soft, non-tender; bowel sounds normoactive; no organomegaly      Assessment: 54 y.o. female with history of HTN, hepatic steatosis, here for CRC screening. Negative Cologuard in 2023. No prior colonoscopy. Denies family hx of colon cancer. Not on anticoagulation     Plan:  Colonoscopy today    Haroon Sharp MD  Gastroenterology Fellow - [facility]

## 2025-06-19 NOTE — TRANSFER OF CARE
"Anesthesia Transfer of Care Note    Patient: Clayton Sandoval    Procedure(s) Performed: Procedure(s) (LRB):  COLONOSCOPY (N/A)    Patient location: GI    Anesthesia Type: general    Transport from OR: Transported from OR on room air with adequate spontaneous ventilation    Post pain: adequate analgesia    Post assessment: no apparent anesthetic complications and tolerated procedure well    Post vital signs: stable    Level of consciousness: awake and alert    Nausea/Vomiting: no nausea/vomiting    Complications: none    Transfer of care protocol was followed      Last vitals: Visit Vitals  /85 (BP Location: Left arm, Patient Position: Lying)   Pulse 80   Temp 36.5 °C (97.7 °F) (Skin)   Resp 17   Ht 5' 4" (1.626 m)   Wt 94.3 kg (208 lb)   LMP 12/20/2020 (Exact Date)   SpO2 96%   Breastfeeding No   BMI 35.70 kg/m²     "

## 2025-08-07 ENCOUNTER — TELEPHONE (OUTPATIENT)
Dept: OBSTETRICS AND GYNECOLOGY | Facility: CLINIC | Age: 55
End: 2025-08-07
Payer: MEDICAID

## 2025-08-07 NOTE — TELEPHONE ENCOUNTER
Copied from CRM #[id number]. Topic: Appointments - Amb Referral  >> Aug 7, 2025 10:53 AM Asya wrote:  Type: Patient Call Back    Who called: Pt     What is the request in detail: Pt is requesting mammogram orders to be put in and scheduled. Please assist.     Can the clinic reply by [facility]? No     Would the patient rather a call back or a response via [facility]?  Call back     Best call back number: [phone]     Additional Information: No

## 2025-08-08 ENCOUNTER — TELEPHONE (OUTPATIENT)
Dept: ORTHOPEDICS | Facility: CLINIC | Age: 55
End: 2025-08-08
Payer: MEDICAID

## 2025-08-08 NOTE — TELEPHONE ENCOUNTER
Spoke to patient. Patient was scheduled for 8/11/25 with SB for knee pain. SB does not treat the knees so she is now scheduled for 8/14/25 with [facility]

## 2025-08-14 ENCOUNTER — OFFICE VISIT (OUTPATIENT)
Facility: CLINIC | Age: 55
End: 2025-08-14
Payer: MEDICAID

## 2025-08-14 ENCOUNTER — HOSPITAL ENCOUNTER (OUTPATIENT)
Dept: RADIOLOGY | Facility: HOSPITAL | Age: 55
Discharge: HOME | End: 2025-08-14
Attending: PHYSICIAN ASSISTANT
Payer: MEDICAID

## 2025-08-14 VITALS — WEIGHT: 207.88 LBS | BODY MASS INDEX: 35.49 KG/M2 | HEIGHT: 64 IN

## 2025-08-14 DIAGNOSIS — M17.0 PRIMARY OSTEOARTHRITIS OF BOTH KNEES: Primary | ICD-10-CM

## 2025-08-14 DIAGNOSIS — M17.0 PRIMARY OSTEOARTHRITIS OF BOTH KNEES: ICD-10-CM

## 2025-08-14 PROCEDURE — 99213 OFFICE O/P EST LOW 20 MIN: CPT | Mod: PBBFAC,25,PO | Performed by: PHYSICIAN ASSISTANT

## 2025-08-14 PROCEDURE — 99213 OFFICE O/P EST LOW 20 MIN: CPT | Mod: S$PBB,,, | Performed by: PHYSICIAN ASSISTANT

## 2025-08-14 PROCEDURE — 73562 X-RAY EXAM OF KNEE 3: CPT | Mod: TC,50,PO

## 2025-08-14 PROCEDURE — 73562 X-RAY EXAM OF KNEE 3: CPT | Mod: 26,50,, | Performed by: RADIOLOGY

## 2025-08-14 PROCEDURE — 1159F MED LIST DOCD IN RCRD: CPT | Mod: CPTII,,, | Performed by: PHYSICIAN ASSISTANT

## 2025-08-14 PROCEDURE — 3008F BODY MASS INDEX DOCD: CPT | Mod: CPTII,,, | Performed by: PHYSICIAN ASSISTANT

## 2025-08-14 PROCEDURE — 1160F RVW MEDS BY RX/DR IN RCRD: CPT | Mod: CPTII,,, | Performed by: PHYSICIAN ASSISTANT

## 2025-08-14 PROCEDURE — 99999 PR PBB SHADOW E&M-EST. PATIENT-LVL III: CPT | Mod: PBBFAC,,, | Performed by: PHYSICIAN ASSISTANT

## 2025-08-14 RX ORDER — MELOXICAM 15 MG/1
15 TABLET ORAL DAILY
Qty: 30 TABLET | Refills: 1 | Status: SHIPPED | OUTPATIENT
Start: 2025-08-14 | End: 2025-10-13

## 2025-08-20 ENCOUNTER — TELEPHONE (OUTPATIENT)
Dept: ORTHOPEDICS | Facility: CLINIC | Age: 55
End: 2025-08-20
Payer: MEDICAID

## 2025-09-04 ENCOUNTER — OFFICE VISIT (OUTPATIENT)
Dept: ORTHOPEDICS | Facility: CLINIC | Age: 55
End: 2025-09-04
Payer: MEDICAID

## 2025-09-04 DIAGNOSIS — M17.12 PRIMARY OSTEOARTHRITIS OF LEFT KNEE: Primary | ICD-10-CM

## 2025-09-04 PROCEDURE — 99214 OFFICE O/P EST MOD 30 MIN: CPT | Mod: S$PBB,,, | Performed by: ORTHOPAEDIC SURGERY

## 2025-09-04 PROCEDURE — 1160F RVW MEDS BY RX/DR IN RCRD: CPT | Mod: CPTII,,, | Performed by: ORTHOPAEDIC SURGERY

## 2025-09-04 PROCEDURE — 99213 OFFICE O/P EST LOW 20 MIN: CPT | Mod: PBBFAC,PN | Performed by: ORTHOPAEDIC SURGERY

## 2025-09-04 PROCEDURE — 99999 PR PBB SHADOW E&M-EST. PATIENT-LVL III: CPT | Mod: PBBFAC,,, | Performed by: ORTHOPAEDIC SURGERY

## 2025-09-04 PROCEDURE — 1159F MED LIST DOCD IN RCRD: CPT | Mod: CPTII,,, | Performed by: ORTHOPAEDIC SURGERY
